# Patient Record
Sex: FEMALE | Race: BLACK OR AFRICAN AMERICAN | NOT HISPANIC OR LATINO | Employment: FULL TIME | URBAN - METROPOLITAN AREA
[De-identification: names, ages, dates, MRNs, and addresses within clinical notes are randomized per-mention and may not be internally consistent; named-entity substitution may affect disease eponyms.]

---

## 2017-02-21 ENCOUNTER — INITIAL PRENATAL (OUTPATIENT)
Dept: OBGYN | Facility: CLINIC | Age: 22
End: 2017-02-21
Payer: MEDICAID

## 2017-02-21 ENCOUNTER — HOSPITAL ENCOUNTER (OUTPATIENT)
Facility: MEDICAL CENTER | Age: 22
End: 2017-02-21
Attending: NURSE PRACTITIONER
Payer: MEDICAID

## 2017-02-21 VITALS
DIASTOLIC BLOOD PRESSURE: 74 MMHG | WEIGHT: 209 LBS | SYSTOLIC BLOOD PRESSURE: 118 MMHG | HEIGHT: 67 IN | BODY MASS INDEX: 32.8 KG/M2

## 2017-02-21 DIAGNOSIS — Z34.82 PRENATAL CARE, SUBSEQUENT PREGNANCY, SECOND TRIMESTER: ICD-10-CM

## 2017-02-21 DIAGNOSIS — Z98.891 HISTORY OF CESAREAN DELIVERY: ICD-10-CM

## 2017-02-21 DIAGNOSIS — O09.891 SUPERVISION OF OTHER HIGH RISK PREGNANCY, ANTEPARTUM, FIRST TRIMESTER: ICD-10-CM

## 2017-02-21 PROBLEM — O09.899 SUPERVISION OF OTHER HIGH RISK PREGNANCY, ANTEPARTUM: Status: ACTIVE | Noted: 2017-02-21

## 2017-02-21 LAB
APPEARANCE UR: NORMAL
BILIRUB UR STRIP-MCNC: NORMAL MG/DL
COLOR UR AUTO: NORMAL
GLUCOSE UR STRIP.AUTO-MCNC: NORMAL MG/DL
KETONES UR STRIP.AUTO-MCNC: NORMAL MG/DL
LEUKOCYTE ESTERASE UR QL STRIP.AUTO: NORMAL
NITRITE UR QL STRIP.AUTO: NORMAL
PH UR STRIP.AUTO: 5 [PH] (ref 5–8)
PROT UR QL STRIP: NORMAL MG/DL
RBC UR QL AUTO: NORMAL
SP GR UR STRIP.AUTO: 1.01
UROBILINOGEN UR STRIP-MCNC: NORMAL MG/DL

## 2017-02-21 PROCEDURE — 87624 HPV HI-RISK TYP POOLED RSLT: CPT

## 2017-02-21 PROCEDURE — 81002 URINALYSIS NONAUTO W/O SCOPE: CPT | Performed by: NURSE PRACTITIONER

## 2017-02-21 PROCEDURE — 88175 CYTOPATH C/V AUTO FLUID REDO: CPT

## 2017-02-21 PROCEDURE — 87491 CHLMYD TRACH DNA AMP PROBE: CPT

## 2017-02-21 PROCEDURE — 59402 PR NEW OB HIGH RISK: CPT | Performed by: NURSE PRACTITIONER

## 2017-02-21 PROCEDURE — 87591 N.GONORRHOEAE DNA AMP PROB: CPT

## 2017-02-21 NOTE — PROGRESS NOTES
"S:  Joanne Claire is a 21 y.o.  who presents for her new OB exam.  She is 13w2d with and LUDY of Estimated Date of Delivery: 17 by LMP.  She has no complaints except for nausea for which she does not wish for intervention. She believes it is resolving now that she is approaching second trimester.  She is currently not working outside the home. No heavy lifting or chemical exposure. No ER visits or previous care in this pregnancy.     desires AFP.  declines CF.  Denies VB, LOF, or cramping.  Denies dysuria, vaginal DC. Reports possible fetal movement.     Pt is  and lives with FOB.  Pregnancy is desired.      Past Medical History   Diagnosis Date   • ASTHMA Dx @ 5 yo     inhaler - does use now - last 7 or 8 yo     Family History   Problem Relation Age of Onset   • Arthritis Mother    • Diabetes Maternal Grandmother      insulin   • Hypertension Maternal Grandmother      Social History     Social History   • Marital Status: Single     Spouse Name: N/A   • Number of Children: N/A   • Years of Education: N/A     Occupational History   • Not on file.     Social History Main Topics   • Smoking status: Never Smoker    • Smokeless tobacco: Never Used   • Alcohol Use: No   • Drug Use: No      Comment: THC use- quit in May of 2014   • Sexual Activity:     Partners: Male      Comment: None     Other Topics Concern   • Not on file     Social History Narrative     OB History    Para Term  AB SAB TAB Ectopic Multiple Living   3 2 2       2      # Outcome Date GA Lbr Chandler/2nd Weight Sex Delivery Anes PTL Lv   3 Current            2 Term 01/18/15 40w2d  3.26 kg (7 lb 3 oz) F CS-Unspec EPI Y Y   1 Term 13 41w0d  3.657 kg (8 lb 1 oz) F CS-LTranv Spinal N Y      Comments: Arrest of dilation.          History of HSV I or II in self or partner: no  History of Thyroid problems: no    O:    Filed Vitals:    17 0757   BP: 118/74   Height: 1.702 m (5' 7\")   Weight: 94.802 kg (209 lb)    "   See Prenatal Physical.    Wet mount: none      A:   1.  IUP @ 13w2d per LMP        2.  S=D        3.  See problem list below               Patient Active Problem List    Diagnosis Date Noted   • History of  delivery 2017   • Supervision of other high risk pregnancy, antepartum 2017   • ASTHMA          P:  1.  GC/CT & pap done        2.  Prenatal labs ordered - lab slip given        3.  Discussed PNV, diet, avoidances and adequate water intake        4.  NOB packet given        5.  Return to office in 4 wks        6.  Complete OB US in 5 wks           No orders of the defined types were placed in this encounter.

## 2017-02-21 NOTE — PROGRESS NOTES
NOB visit   Pt c/o having cramping and nausea, denies any other complications.   # [phone]

## 2017-02-21 NOTE — MR AVS SNAPSHOT
"Joanne Claire   2017 7:30 AM   Initial Prenatal   MRN: [id number]    Department[facility]  [facility]   Dept Phone:  [phone]    Description:  Female : 1995   Provider:  Matilde Figueroa D.N.P.; [facility] INTAKE           Allergies as of 2017     No Known Allergies      You were diagnosed with     Prenatal care, subsequent pregnancy, second trimester   [658665]       History of  delivery   [830380]       Supervision of other high risk pregnancy, antepartum, first trimester   [4077946]         Vital Signs     Blood Pressure Height Weight Body Mass Index Last Menstrual Period Smoking Status    118/74 mmHg 1.702 m (5' 7\") 94.802 kg (209 lb) 32.73 kg/m2 2016 Never Smoker       Basic Information     Date Of Birth Sex Race Ethnicity Preferred Language    1995 Female Black or  Non- English      Your appointments     Mar 22, 2017 10:45 AM   OB Follow Up with Matilde Figueroa D.N.P.   [facility] ([address] [id number] [phone]            2017  8:00 AM   US PREG 60 with PREG CTR US 1   [facility]  [address] [place] [address]   [phone]           For [facility] [place] patients only: 1. Please arrive 15 min prior to your appointment time. 2. If you're late, you will be rescheduled for the next available appointment. 3. If you need to reschedule your appointment, please call us at [phone] 48 hours prior to your appointment. 4. Do not bring children as they will not be allowed in exam room. 5. Only one family member may be present in room during exam. 6. The exam will be 30-60 minutes depending on the exam ordered by the physician. 7. The sonographer is not allowed to discuss findings during the exam. Your provider will go over the results with you at your next appointment. 8. The purpose of this ultrasound " is to determine if baby is healthy. Diagnostic ultrasounds are NOT to determine the gender of the baby. 9. NO photography or video recording is allowed in exam room. 10. NO cell phones allowed in the exam room. INFORMACION SOBRE ALDANA ULTRASONIDO 1. Por favor de llegar 15 minutos antes de aldana nicole. 2. Si llega tarde, le tenemos que cambiar la nicole para otra fecha. 3. Si necesita cambiar aldana nicole, por favor llame 48 horas antes de la nicole. [phone] 4. Por favor no traer niños. No se permiten en cuarto de Ultrasonido. 5. Solamente se permite rubio persona en el cuarto patel el examen. 6. El examen dura 30-60 minutos, dependiendo del examen ordenado por el Doctor. 7. El Sonógrafo no está autorizado hablar sobre aldana examen. Aldana doctor o partera le va explicar los resultados en aldana próxima nicole. 8. El propósito del Ultrasonido es para determinar si aldana hiram viene saludable. No es para determinar el sexo de aldana hiram. 9. Por favor no fotos o cámaras de grabar. 10. No celulares permitidos en el cuarto de examen.              Problem List              ICD-10-CM Priority Class Noted - Resolved    ASTHMA    Unknown - Present    History of  delivery Z98.891   2017 - Present    Supervision of other high risk pregnancy, antepartum O09.899   2017 - Present      Health Maintenance        Date Due Completion Dates    IMM HEP B VACCINE (1 of 3 - Primary Series) 1993 ---    IMM HEP A VACCINE (1 of 2 - Standard Series) 1994 ---    IMM HPV VACCINE (1 of 3 - Female 3 Dose Series) 2004 ---    IMM VARICELLA (CHICKENPOX) VACCINE (1 of 2 - 2 Dose Adolescent Series) 2006 ---    IMM INFLUENZA (1) 2016 10/24/2014    PAP SMEAR 2017    IMM DTaP/Tdap/Td Vaccine (2 - Td) 10/24/2024 10/24/2014            Results     POCT Urinalysis      Component Value Standard Range & Units    POC Color  Negative    POC Appearance  Negative    POC Leukocyte Esterase trace Negative    POC Nitrites neg Negative     POC Urobiligen  Negative (0.2) mg/dL    POC Protein trace Negative mg/dL    POC Urine PH 5.0 5.0 - 8.0    POC Blood trace Negative    POC Specific Gravity 1.015 <1.005 - >1.030    POC Ketones neg Negative mg/dL    POC Biliruben  Negative mg/dL    POC Glucose neg Negative mg/dL                        Current Immunizations     Influenza Vaccine Quad Inj (Pf) 10/24/2014    MMR/Varicella Combined Vaccine  Incomplete,  Incomplete    Pneumococcal polysaccharide vaccine (PPSV-23) 1/19/2015  1:17 PM    Tdap Vaccine  Incomplete, 10/24/2014,  Incomplete      Below and/or attached are the medications your provider expects you to take. Review all of your home medications and newly ordered medications with your provider and/or pharmacist. Follow medication instructions as directed by your provider and/or pharmacist. Please keep your medication list with you and share with your provider. Update the information when medications are discontinued, doses are changed, or new medications (including over-the-counter products) are added; and carry medication information at all times in the event of emergency situations     Allergies:  No Known Allergies          Medications  Valid as of: February 21, 2017 -  8:31 AM    Generic Name Brand Name Tablet Size Instructions for use    Prenatal MV-Min-Fe Fum-FA-DHA   Take  by mouth.        .                 Medicines prescribed today were sent to:     [email or web address] [id number] [place] - [address] [facility]    [address] [facility] [address]    Phone: [phone] Fax: [phone]    Open 24 Hours?: No    [facility]ACY #[id number] - [place] - [address] [address]    Phone: [phone] Fax: [phone]    Open 24 Hours?: No      Medication refill instructions:       If your prescription bottle indicates you have medication refills left, it is not necessary to call your provider’s office. Please contact your  pharmacy and they will refill your medication.    If your prescription bottle indicates you do not have any refills left, you may request refills at any time through one of the following ways: The online [employer] system (except Urgent Care), by calling your provider’s office, or by asking your pharmacy to contact your provider’s office with a refill request. Medication refills are processed only during regular business hours and may not be available until the next business day. Your provider may request additional information or to have a follow-up visit with you prior to refilling your medication.   *Please Note: Medication refills are assigned a new Rx number when refilled electronically. Your pharmacy may indicate that no refills were authorized even though a new prescription for the same medication is available at the pharmacy. Please request the medicine by name with the pharmacy before contacting your provider for a refill.        Your To Do List     Future Labs/Procedures Complete By Expires    PREG CNTR PRENATAL PN  As directed 2/22/2018    THINPREP RFLX HPV ASCUS W/CTNG  As directed 2/21/2018    US-OB 2ND 3RD TRI COMPLETE  As directed 2/21/2018         [employer] Status: Patient Declined

## 2017-02-22 LAB
C TRACH DNA GENITAL QL NAA+PROBE: NEGATIVE
CYTOLOGY REG CYTOL: NORMAL
N GONORRHOEA DNA GENITAL QL NAA+PROBE: NEGATIVE
SPECIMEN SOURCE: NORMAL

## 2017-02-24 LAB
HPV HR 12 DNA CVX QL NAA+PROBE: POSITIVE
HPV16 DNA SPEC QL NAA+PROBE: POSITIVE
HPV18 DNA SPEC QL NAA+PROBE: POSITIVE
SPECIMEN SOURCE: ABNORMAL

## 2017-02-27 ENCOUNTER — TELEPHONE (OUTPATIENT)
Dept: OBGYN | Facility: CLINIC | Age: 22
End: 2017-02-27

## 2017-02-27 PROBLEM — R87.810 ASCUS WITH POSITIVE HIGH RISK HPV CERVICAL: Status: ACTIVE | Noted: 2017-02-27

## 2017-02-27 PROBLEM — R87.610 ASCUS WITH POSITIVE HIGH RISK HPV CERVICAL: Status: ACTIVE | Noted: 2017-02-27

## 2017-02-27 NOTE — TELEPHONE ENCOUNTER
----- Message from Matilde Figueroa D.N.P. sent at 2/27/2017  9:43 AM PST -----  Per ASCCP guidelines ASCUS with HPV needs colpo.      Pt notified of abnormal pap and need colposcopy. Procedure explained to pt. Colpo scheduled for 3/17/17 at 0900. All questions answered. Pt agreed and verbalized understanding.

## 2017-04-04 ENCOUNTER — GYNECOLOGY VISIT (OUTPATIENT)
Dept: OBGYN | Facility: CLINIC | Age: 22
End: 2017-04-04
Payer: MEDICAID

## 2017-04-04 VITALS — SYSTOLIC BLOOD PRESSURE: 108 MMHG | WEIGHT: 206 LBS | BODY MASS INDEX: 32.26 KG/M2 | DIASTOLIC BLOOD PRESSURE: 68 MMHG

## 2017-04-04 DIAGNOSIS — R87.811 ASCUS WITH POSITIVE HIGH RISK HUMAN PAPILLOMAVIRUS OF VAGINA: ICD-10-CM

## 2017-04-04 DIAGNOSIS — Z98.891 HISTORY OF CESAREAN DELIVERY: ICD-10-CM

## 2017-04-04 DIAGNOSIS — R87.620 ASCUS WITH POSITIVE HIGH RISK HUMAN PAPILLOMAVIRUS OF VAGINA: ICD-10-CM

## 2017-04-04 PROCEDURE — 57452 EXAM OF CERVIX W/SCOPE: CPT | Performed by: OBSTETRICS & GYNECOLOGY

## 2017-04-04 PROCEDURE — 90040 PR PRENATAL FOLLOW UP: CPT | Performed by: OBSTETRICS & GYNECOLOGY

## 2017-04-04 NOTE — MR AVS SNAPSHOT
Joanne Claire   2017 10:00 AM   Gynecology Visit   MRN: [id number]    Department:  [facility]   Dept Phone:  [phone]    Description:  Female : 1995   Provider:  Naima Saunders M.D.           Allergies as of 2017     No Known Allergies      You were diagnosed with     ASCUS with positive high risk human papillomavirus of vagina   [0994418]       History of  delivery   [690196]         Vital Signs     Blood Pressure Weight Last Menstrual Period Smoking Status          108/68 mmHg 93.441 kg (206 lb) 2016 Never Smoker         Basic Information     Date Of Birth Sex Race Ethnicity Preferred Language    1995 Female Black or  Non- English      Your appointments     2017  8:00 AM   US PREG 60 with PREG CTR US 1   [facility] ([facility])    [facility]  [address] [place] [id number] [phone]           For [facility] patients only: 1. Please arrive 15 min prior to your appointment time. 2. If you're late, you will be rescheduled for the next available appointment. 3. If you need to reschedule your appointment, please call us at [phone] 48 hours prior to your appointment. 4. Do not bring children as they will not be allowed in exam room. 5. Only one family member may be present in room during exam. 6. The exam will be 30-60 minutes depending on the exam ordered by the physician. 7. The sonographer is not allowed to discuss findings during the exam. Your provider will go over the results with you at your next appointment. 8. The purpose of this ultrasound is to determine if baby is healthy. Diagnostic ultrasounds are NOT to determine the gender of the baby. 9. NO photography or video recording is allowed in exam room. 10. NO cell phones allowed in the exam room. INFORMACION SOBRE HANNON ULTRASONIDO 1. Por favor de llegar 15 minutos antes de hannon nicole. 2. Si llega  tarde, le tenemos que cambiar la nicole para otra fecha. 3. Si necesita cambiar aldana nicole, por favor llame 48 horas antes de la nicole. [phone] 4. Por favor no traer niños. No se permiten en cuarto de Ultrasonido. 5. Solamente se permite rubio persona en el cuarto patel el examen. 6. El examen dura 30-60 minutos, dependiendo del examen ordenado por el Doctor. 7. El Sonógrafo no está autorizado hablar sobre aldana examen. Aldana doctor o partera le va explicar los resultados en aldana próxima nicole. 8. El propósito del Ultrasonido es para determinar si aldana hiram viene saludable. No es para determinar el sexo de aldana hiram. 9. Por favor no fotos o cámaras de grabar. 10. No celulares permitidos en el cuarto de examen.            May 02, 2017 11:30 AM   OB Follow Up with CABRERA Mcdermott   [facility])    [address] [place] [address] [address]  [facility] [address]   [phone]              Problem List              ICD-10-CM Priority Class Noted - Resolved    ASTHMA    Unknown - Present    History of  delivery Z98.891   2017 - Present    Supervision of other high risk pregnancy, antepartum O09.899   2017 - Present    ASCUS with positive high risk HPV cervical R87.610, R87.810   2017 - Present      Health Maintenance        Date Due Completion Dates    IMM HEP B VACCINE (1 of 3 - Primary Series) 1993 ---    IMM HEP A VACCINE (1 of 2 - Standard Series) 1994 ---    IMM HPV VACCINE (1 of 3 - Female 3 Dose Series) 2004 ---    IMM VARICELLA (CHICKENPOX) VACCINE (1 of 2 - 2 Dose Adolescent Series) 2006 ---    PAP SMEAR 2020, 2014    IMM DTaP/Tdap/Td Vaccine (2 - Td) 10/24/2024 10/24/2014            Current Immunizations     Influenza Vaccine Quad Inj (Pf) 10/24/2014    MMR/Varicella Combined Vaccine  Incomplete,  Incomplete    Pneumococcal polysaccharide vaccine (PPSV-23) 2015  1:17 PM    Tdap Vaccine  Incomplete, 10/24/2014,  Incomplete      Below and/or attached are  the medications your provider expects you to take. Review all of your home medications and newly ordered medications with your provider and/or pharmacist. Follow medication instructions as directed by your provider and/or pharmacist. Please keep your medication list with you and share with your provider. Update the information when medications are discontinued, doses are changed, or new medications (including over-the-counter products) are added; and carry medication information at all times in the event of emergency situations     Allergies:  No Known Allergies          Medications  Valid as of: April 04, 2017 - 10:41 AM    Generic Name Brand Name Tablet Size Instructions for use    Prenatal MV-Min-Fe Fum-FA-DHA   Take  by mouth.        .                 Medicines prescribed today were sent to:     [email or web address] [address] [place] [address] - [address] [address] [facility]    [address] [address] [facility] [address]    Phone: [phone] Fax: [phone]    Open 24 Hours?: No    [facility]/PHARMACY #[id number] - [place], [place] - [address] [facility]    [address] [facility] [place] [address]    Phone: [phone] Fax: [phone]    Open 24 Hours?: No      Medication refill instructions:       If your prescription bottle indicates you have medication refills left, it is not necessary to call your provider’s office. Please contact your pharmacy and they will refill your medication.    If your prescription bottle indicates you do not have any refills left, you may request refills at any time through one of the following ways: The online [employer] system (except Urgent Care), by calling your provider’s office, or by asking your pharmacy to contact your provider’s office with a refill request. Medication refills are processed only during regular business hours and may not be available until the next business day. Your provider may request additional information or to have a follow-up visit with you prior to refilling  your medication.   *Please Note: Medication refills are assigned a new Rx number when refilled electronically. Your pharmacy may indicate that no refills were authorized even though a new prescription for the same medication is available at the pharmacy. Please request the medicine by name with the pharmacy before contacting your provider for a refill.        Your To Do List     Future Labs/Procedures Complete By Expires    [facility] TETRA  As directed 4/5/2018      Instructions    PTL precautions          MyChart Access Code: Activation code not generated  Current [employer] Status: Active

## 2017-04-04 NOTE — PROGRESS NOTES
Pt. Here for OB/FU for COLPO today. Reports Good FM.   Good # [phone]  U/S on 4/11/17  Pt states went to [place] on 4/2/17 for really bad side pains, had U/S done.   Pt states no complaints.   Pt desires AFP

## 2017-04-04 NOTE — PROGRESS NOTES
Pt denies CTX, LOF, VB or any other c/o.  Good fetal movement. Pt also presents for colpo for ASCUS with positive HR HPV    Lab:No results found for this or any previous visit (from the past 672 hour(s)).     Colposcopy    Indication:  ASCUS with positive HR HPV    Prior to beginning the procedure, risk and benefits of a colposcopy with possibility of biopsies were discussed including the risk of infection, bleeding, and pain. Patient understands the risks associated with the procedure, questions have been answered and consents have been signed to the chart.    Procedure:    Speculum is placed and cervix is visualized. The cervix is cleansed with dilute acetic acid solution.     Satisfactory: YES   Squamo-columnar junction seen: YES   Entire lesion seen: YES   Biopsies done: NO   Biopsy site:  N/A  ECC: NO    Impression:  CLARA 1 d/t AWE        Recommendations:    PP pap    See colpo sheet for further info.      A/P:  21 y.o.  at 19w2d presents for obstetric follow-up and colpo    1.  Continue prenatal vitamins.  2.  Begin/continue kick counts at 28 weeks   3.  Watch weight gain.  4.  Increase water intake.  5.  Follow-up in 4 weeks.  6.  PTL/labor precautions given

## 2017-04-04 NOTE — Clinical Note
COLPOSCOPY / LEEP DATA SHEET    Joanne Claire     1995      21 y.o.      Patient's last menstrual period was 11/20/2016.     @EDC@       Medical history:   o MVP    o Blood dyscrasia    o Rh     o Other: .    STD history:    o HPV     o HSV     o HIV     o GC     o Chlamydia     o None     o Other: .    HIV:   o Positive     o Negative                            IV Drug User:   o  Yes    o  No .    Age of first coitus:                                                Number of sex partners in lifetime:  .    Reason for exam:.    Prior abnormal PAPS?    o  Yes  o  No        Treatment: .    Prior history of condyloma?    o  Yes  o  No        Treatment:.     Colposcopic view - description:                                 Satisfactory?    o  Yes  o  No                    Squamo-columnar junction seen?  o  Yes  o  No.    Entire lesion seen?     o  Yes  o  No          PAP done?  o  Yes  o  No           Biopsies done?  o  Yes  o  No.    Biopsy sites:.    ECC done?    o  Yes  o  No      If no, reason:.    Impression:.    Recommendations:.             Colposcopist: ______________________________________________ Date: ___________________

## 2017-04-05 ENCOUNTER — HOSPITAL ENCOUNTER (OUTPATIENT)
Dept: LAB | Facility: MEDICAL CENTER | Age: 22
End: 2017-04-05
Attending: OBSTETRICS & GYNECOLOGY
Payer: MEDICAID

## 2017-04-05 ENCOUNTER — HOSPITAL ENCOUNTER (OUTPATIENT)
Dept: LAB | Facility: MEDICAL CENTER | Age: 22
End: 2017-04-05
Attending: NURSE PRACTITIONER
Payer: MEDICAID

## 2017-04-05 DIAGNOSIS — Z34.82 PRENATAL CARE, SUBSEQUENT PREGNANCY, SECOND TRIMESTER: ICD-10-CM

## 2017-04-05 DIAGNOSIS — Z98.891 HISTORY OF CESAREAN DELIVERY: ICD-10-CM

## 2017-04-05 DIAGNOSIS — R87.811 ASCUS WITH POSITIVE HIGH RISK HUMAN PAPILLOMAVIRUS OF VAGINA: ICD-10-CM

## 2017-04-05 DIAGNOSIS — R87.620 ASCUS WITH POSITIVE HIGH RISK HUMAN PAPILLOMAVIRUS OF VAGINA: ICD-10-CM

## 2017-04-05 LAB
ABO GROUP BLD: NORMAL
AMORPH CRY #/AREA URNS HPF: PRESENT /HPF
APPEARANCE UR: ABNORMAL
BACTERIA #/AREA URNS HPF: ABNORMAL /HPF
BASOPHILS # BLD AUTO: 0.5 % (ref 0–1.8)
BASOPHILS # BLD: 0.04 K/UL (ref 0–0.12)
BILIRUB UR QL STRIP.AUTO: NEGATIVE
BLD GP AB SCN SERPL QL: NORMAL
COLOR UR: COLORLESS
CULTURE IF INDICATED INDCX: YES UA CULTURE
EOSINOPHIL # BLD AUTO: 0.19 K/UL (ref 0–0.51)
EOSINOPHIL NFR BLD: 2.3 % (ref 0–6.9)
EPI CELLS #/AREA URNS HPF: ABNORMAL /HPF
ERYTHROCYTE [DISTWIDTH] IN BLOOD BY AUTOMATED COUNT: 45.2 FL (ref 35.9–50)
GLUCOSE UR STRIP.AUTO-MCNC: NEGATIVE MG/DL
HBV SURFACE AG SER QL: NEGATIVE
HCT VFR BLD AUTO: 35.2 % (ref 37–47)
HGB BLD-MCNC: 11.6 G/DL (ref 12–16)
HIV 1+2 AB+HIV1 P24 AG SERPL QL IA: NON REACTIVE
IMM GRANULOCYTES # BLD AUTO: 0.03 K/UL (ref 0–0.11)
IMM GRANULOCYTES NFR BLD AUTO: 0.4 % (ref 0–0.9)
KETONES UR STRIP.AUTO-MCNC: NEGATIVE MG/DL
LEUKOCYTE ESTERASE UR QL STRIP.AUTO: ABNORMAL
LYMPHOCYTES # BLD AUTO: 2.76 K/UL (ref 1–4.8)
LYMPHOCYTES NFR BLD: 33.5 % (ref 22–41)
MCH RBC QN AUTO: 24.9 PG (ref 27–33)
MCHC RBC AUTO-ENTMCNC: 33 G/DL (ref 33.6–35)
MCV RBC AUTO: 75.7 FL (ref 81.4–97.8)
MICRO URNS: ABNORMAL
MONOCYTES # BLD AUTO: 0.67 K/UL (ref 0–0.85)
MONOCYTES NFR BLD AUTO: 8.1 % (ref 0–13.4)
NEUTROPHILS # BLD AUTO: 4.54 K/UL (ref 2–7.15)
NEUTROPHILS NFR BLD: 55.2 % (ref 44–72)
NITRITE UR QL STRIP.AUTO: NEGATIVE
NRBC # BLD AUTO: 0 K/UL
NRBC BLD AUTO-RTO: 0 /100 WBC
PH UR STRIP.AUTO: 6.5 [PH]
PLATELET # BLD AUTO: 308 K/UL (ref 164–446)
PMV BLD AUTO: 10.8 FL (ref 9–12.9)
PROT UR QL STRIP: NEGATIVE MG/DL
RBC # BLD AUTO: 4.65 M/UL (ref 4.2–5.4)
RBC UR QL AUTO: NEGATIVE
RH BLD: NORMAL
RUBV AB SER QL: 38.4 IU/ML
SP GR UR STRIP.AUTO: 1.01
TREPONEMA PALLIDUM IGG+IGM AB [PRESENCE] IN SERUM OR PLASMA BY IMMUNOASSAY: NON REACTIVE
WBC # BLD AUTO: 8.2 K/UL (ref 4.8–10.8)
WBC #/AREA URNS HPF: ABNORMAL /HPF

## 2017-04-05 PROCEDURE — 36415 COLL VENOUS BLD VENIPUNCTURE: CPT

## 2017-04-05 PROCEDURE — 81001 URINALYSIS AUTO W/SCOPE: CPT

## 2017-04-05 PROCEDURE — 86762 RUBELLA ANTIBODY: CPT

## 2017-04-05 PROCEDURE — 86900 BLOOD TYPING SEROLOGIC ABO: CPT

## 2017-04-05 PROCEDURE — 86780 TREPONEMA PALLIDUM: CPT

## 2017-04-05 PROCEDURE — 86850 RBC ANTIBODY SCREEN: CPT

## 2017-04-05 PROCEDURE — 87086 URINE CULTURE/COLONY COUNT: CPT

## 2017-04-05 PROCEDURE — 86901 BLOOD TYPING SEROLOGIC RH(D): CPT

## 2017-04-05 PROCEDURE — 81511 FTL CGEN ABNOR FOUR ANAL: CPT

## 2017-04-05 PROCEDURE — 85025 COMPLETE CBC W/AUTO DIFF WBC: CPT

## 2017-04-05 PROCEDURE — 87389 HIV-1 AG W/HIV-1&-2 AB AG IA: CPT

## 2017-04-05 PROCEDURE — 87340 HEPATITIS B SURFACE AG IA: CPT

## 2017-04-07 LAB
# FETUSES US: NORMAL
AFP MOM SERPL: 0.7
AFP SERPL-MCNC: 32 NG/ML
AGE - REPORTED: 24.3 YR
BACTERIA UR CULT: NORMAL
GA METHOD: NORMAL
GA: 19.43 WEEKS
HCG MOM SERPL: 1.18
HCG SERPL-ACNC: NORMAL IU/L
IDDM PATIENT QL: NO
INHIBIN A MOM SERPL: 0.4
INHIBIN A SERPL-MCNC: 55 PG/ML
INTEGRATED SCN PATIENT-IMP: NORMAL
PATHOLOGY STUDY: NORMAL
SIGNIFICANT IND 70042: NORMAL
SOURCE SOURCE: NORMAL
U ESTRIOL MOM SERPL: 0.66
U ESTRIOL SERPL-MCNC: 1.14 NG/ML

## 2017-04-11 ENCOUNTER — APPOINTMENT (OUTPATIENT)
Dept: RADIOLOGY | Facility: IMAGING CENTER | Age: 22
End: 2017-04-11
Attending: NURSE PRACTITIONER
Payer: MEDICAID

## 2017-04-11 DIAGNOSIS — Z34.82 PRENATAL CARE, SUBSEQUENT PREGNANCY, SECOND TRIMESTER: ICD-10-CM

## 2017-04-11 PROCEDURE — 76815 OB US LIMITED FETUS(S): CPT | Performed by: OBSTETRICS & GYNECOLOGY

## 2017-04-12 ENCOUNTER — TELEPHONE (OUTPATIENT)
Dept: OBGYN | Facility: CLINIC | Age: 22
End: 2017-04-12

## 2017-04-12 NOTE — TELEPHONE ENCOUNTER
----- Message from Luzma Ramirez M.D. sent at 4/12/2017  2:34 PM PDT -----  Please call patient and inform her that her due date was changed based on US, LUDY now 9/14/17.  I have ordered a repeat fetal survey to be done in 1-2 weeks (she was too early at previous US).      4/12/17 1519 called but number in file not accepting calls at this time. My chart message sent  4/14/17 1410 not accepting calls at this time and has not answer [employer] message. Will send letter today. DIAMOND in chart

## 2017-04-12 NOTE — Clinical Note
April 14, 2017          Dear Joanne Claire,      Please call us regarding your care.  One of the nurses is available to speak with you Monday through Friday, 8 a.m to 11:30 and 1 p.m. to 4:30 p.m.    Please call us at [phone]; thank you for your prompt attention.        Sincerely,    Angie Orosco R.N.    Electronically Signed

## 2017-05-11 ENCOUNTER — ROUTINE PRENATAL (OUTPATIENT)
Dept: OBGYN | Facility: CLINIC | Age: 22
End: 2017-05-11
Payer: MEDICAID

## 2017-05-11 VITALS — WEIGHT: 210 LBS | SYSTOLIC BLOOD PRESSURE: 112 MMHG | BODY MASS INDEX: 32.88 KG/M2 | DIASTOLIC BLOOD PRESSURE: 60 MMHG

## 2017-05-11 DIAGNOSIS — O09.892 SUPERVISION OF OTHER HIGH RISK PREGNANCY, ANTEPARTUM, SECOND TRIMESTER: ICD-10-CM

## 2017-05-11 PROCEDURE — 90040 PR PRENATAL FOLLOW UP: CPT | Performed by: NURSE PRACTITIONER

## 2017-05-11 NOTE — PROGRESS NOTES
S) Pt is a 22 y.o.   at 22w0d  gestation. Routine prenatal care today. No complaints. Discussed need for other US due to early gestational age at last US.  Reports good fetal movement. Denies cramping, bleeding or leaking of fluid. Denies dysuria. Generally feels well today. Good self-care activities identified. Denies headaches, swelling, visual changes, or epigastric pain.     O) see flow         Filed Vitals:    17 1035   BP: 112/60   Weight: 95.255 kg (210 lb)           Lab:PNL WNL, GCT next visit, AFP normal       Pertinent ultrasound     2017 7:51 AM    HISTORY/REASON FOR EXAM:  Size less than dates    TECHNIQUE/EXAM DESCRIPTION: OB limited ultrasound.    COMPARISON:  None    FINDINGS:  Fetal Lie:  Vertex  LMP:  2016  Clinical LUDY by LMP:  2017    Placenta (Location):  Posterior  Placenta Previa: No    Amniotic Fluid Volume:  JASON = 11.42 cm    Fetal Heart Rate:  146 bpm    Cervical Length:  3.14 cm    No maternal adnexal mass is identified.    Fetal Biometry  BPD    3.88 cm, 17 weeks, 6 days  HC    14.40 cm, 17 weeks, 4 days  AC    11.47 cm, 17 weeks, 2 days  Femur Length    2.54 cm, 17 weeks, 5 days  Humerus Length    2.68 cm, 18 weeks, 3 days    EGA by this US:  17 weeks, 5 days  LUDY by this US: 2017    Estimated Fetal Weight:  197 grams    Comments:         Impression        Single intrauterine pregnancy of an estimated gestational age of 17 weeks, 5 days with an estimated date of delivery of 2017.  Size less than expected for dates.    Complete fetal survey was not performed, due to early gestational age.  Follow-up recommended.                A) IUP at 22w0d       S=D         Patient Active Problem List    Diagnosis Date Noted   • ASCUS with positive high risk HPV cervical 2017   • History of  delivery 2017   • Supervision of other high risk pregnancy, antepartum 2017   • ASTHMA            P) s/s ptl vs general discomforts. Fetal movements  reviewed. General ed and anticipatory guidance. Nutrition/exercise/vitamin. Plans breast. Plans pp contraception- BTL with C/S. Continue PNV.

## 2017-05-11 NOTE — PROGRESS NOTES
OB f/u today  + fetal movement.  No VB, LOF or UC's.  Good phone # [phone] -mom  Preferred pharmacy confirmed.  Pt was notified at this appt she needs another full anatomy scan, per Dr. Ramirez's notes; pt will schedule that today  PN labs done  AFP test - normal screen

## 2017-05-11 NOTE — MR AVS SNAPSHOT
Joanne Claire   2017 10:30 AM   Routine Prenatal   MRN: [id number]    Department:  [facility]   Dept Phone:  [phone]    Description:  Female : 1995   Provider:  Magali Merida C.N.M.           Allergies as of 2017     No Known Allergies      You were diagnosed with     Supervision of other high risk pregnancy, antepartum, second trimester   [[id number]]         Vital Signs     Blood Pressure Weight Last Menstrual Period Smoking Status          112/60 mmHg 95.255 kg (210 lb) 2016 Never Smoker         Basic Information     Date Of Birth Sex Race Ethnicity Preferred Language    1995 Female Black or  Non- English      Your appointments     May 30, 2017  8:00 AM   US PREG 60 with PREG CTR US 1   [facility] ([facility])    [facility]  [address] [place] [id number] [phone]           For [facility] patients only: 1. Please arrive 15 min prior to your appointment time. 2. If you're late, you will be rescheduled for the next available appointment. 3. If you need to reschedule your appointment, please call us at [phone] 48 hours prior to your appointment. 4. Do not bring children as they will not be allowed in exam room. 5. Only one family member may be present in room during exam. 6. The exam will be 30-60 minutes depending on the exam ordered by the physician. 7. The sonographer is not allowed to discuss findings during the exam. Your provider will go over the results with you at your next appointment. 8. The purpose of this ultrasound is to determine if baby is healthy. Diagnostic ultrasounds are NOT to determine the gender of the baby. 9. NO photography or video recording is allowed in exam room. 10. NO cell phones allowed in the exam room. INFORMACION SOBRE ALDANA ULTRASONIDO 1. Por favor de llegar 15 minutos antes de aldana nicole. 2. Si llega tarde, le tenemos que cambiar  la nicole para otra fecha. 3. Si necesita cambiar aldana nicole, por favor llame 48 horas antes de la nicole. [phone] 4. Por favor no traer niños. No se permiten en cuarto de Ultrasonido. 5. Solamente se permite rubio persona en el cuarto patel el examen. 6. El examen dura 30-60 minutos, dependiendo del examen ordenado por el Doctor. 7. El Sonógrafo no está autorizado hablar sobre aldana examen. Aldana doctor o partera le va explicar los resultados en aldana próxima nicole. 8. El propósito del Ultrasonido es para determinar si aldana hiram viene saludable. No es para determinar el sexo de aldana hiram. 9. Por favor no fotos o cámaras de grabar. 10. No celulares permitidos en el cuarto de examen.            2017  9:00 AM   OB Follow Up with Matilde Figueroa D.N.P.   [facility] [address]  [facility] [address]   [phone]              Problem List              ICD-10-CM Priority Class Noted - Resolved    ASTHMA  Medium  Unknown - Present    History of  delivery Z98.891 High  2017 - Present    Supervision of other high risk pregnancy, antepartum O09.899 High  2017 - Present    ASCUS with positive high risk HPV cervical R87.610, R87.810 Medium  2017 - Present      Health Maintenance        Date Due Completion Dates    IMM HEP B VACCINE (1 of 3 - Primary Series) 1993 ---    IMM HEP A VACCINE (1 of 2 - Standard Series) 1994 ---    IMM HPV VACCINE (1 of 3 - Female 3 Dose Series) 2004 ---    IMM VARICELLA (CHICKENPOX) VACCINE (1 of 2 - 2 Dose Adolescent Series) 2006 ---    PAP SMEAR 2020, 2014    IMM DTaP/Tdap/Td Vaccine (2 - Td) 10/24/2024 10/24/2014            Current Immunizations     Influenza Vaccine Quad Inj (Pf) 10/24/2014    MMR/Varicella Combined Vaccine  Incomplete,  Incomplete    Pneumococcal polysaccharide vaccine (PPSV-23) 2015  1:17 PM    Tdap Vaccine  Incomplete, 10/24/2014,  Incomplete      Below and/or attached are the  medications your provider expects you to take. Review all of your home medications and newly ordered medications with your provider and/or pharmacist. Follow medication instructions as directed by your provider and/or pharmacist. Please keep your medication list with you and share with your provider. Update the information when medications are discontinued, doses are changed, or new medications (including over-the-counter products) are added; and carry medication information at all times in the event of emergency situations     Allergies:  No Known Allergies          Medications  Valid as of: May 11, 2017 - 10:58 AM    Generic Name Brand Name Tablet Size Instructions for use    Prenatal MV-Min-Fe Fum-FA-DHA   Take  by mouth.        .                 Medicines prescribed today were sent to:     [facility] [address] [place] [address] [facility]    [address] [facility] [address]    Phone: [phone] Fax: [phone]    Open 24 Hours?: No    [id number] - [place], [place] - [address]    [address] [facility] [place] [address]    Phone: [phone] Fax: [phone]    Open 24 Hours?: No      Medication refill instructions:       If your prescription bottle indicates you have medication refills left, it is not necessary to call your provider’s office. Please contact your pharmacy and they will refill your medication.    If your prescription bottle indicates you do not have any refills left, you may request refills at any time through one of the following ways: The online [employer] system (except Urgent Care), by calling your provider’s office, or by asking your pharmacy to contact your provider’s office with a refill request. Medication refills are processed only during regular business hours and may not be available until the next business day. Your provider may request additional information or to have a follow-up visit with you prior to refilling your  medication.   *Please Note: Medication refills are assigned a new Rx number when refilled electronically. Your pharmacy may indicate that no refills were authorized even though a new prescription for the same medication is available at the pharmacy. Please request the medicine by name with the pharmacy before contacting your provider for a refill.        Other Notes About Your Plan     PNL WNL, AFP WNL, US Sched, BTL with C/S           MyChart Access Code: Activation code not generated  Current MyChart Status: Active

## 2017-05-30 ENCOUNTER — APPOINTMENT (OUTPATIENT)
Dept: RADIOLOGY | Facility: IMAGING CENTER | Age: 22
End: 2017-05-30
Attending: OBSTETRICS & GYNECOLOGY
Payer: MEDICAID

## 2017-05-30 ENCOUNTER — DATING (OUTPATIENT)
Dept: OBGYN | Facility: CLINIC | Age: 22
End: 2017-05-30

## 2017-05-30 DIAGNOSIS — O09.892 SUPERVISION OF OTHER HIGH RISK PREGNANCY, ANTEPARTUM, SECOND TRIMESTER: ICD-10-CM

## 2017-05-30 PROCEDURE — 76805 OB US >/= 14 WKS SNGL FETUS: CPT | Performed by: OBSTETRICS & GYNECOLOGY

## 2017-06-08 ENCOUNTER — ROUTINE PRENATAL (OUTPATIENT)
Dept: OBGYN | Facility: CLINIC | Age: 22
End: 2017-06-08
Payer: MEDICAID

## 2017-06-08 VITALS — SYSTOLIC BLOOD PRESSURE: 110 MMHG | BODY MASS INDEX: 32.26 KG/M2 | WEIGHT: 206 LBS | DIASTOLIC BLOOD PRESSURE: 60 MMHG

## 2017-06-08 DIAGNOSIS — O09.899 SUPERVISION OF OTHER HIGH RISK PREGNANCIES, UNSPECIFIED TRIMESTER: ICD-10-CM

## 2017-06-08 PROCEDURE — 90040 PR PRENATAL FOLLOW UP: CPT | Performed by: NURSE PRACTITIONER

## 2017-06-08 NOTE — MR AVS SNAPSHOT
Joanne Claire   2017 9:00 AM   Routine Prenatal   MRN: [id number]    Department:  [facility]   Dept Phone:  [phone]    Description:  Female : 1995   Provider:  Matilde Figueroa D.N.P.           Allergies as of 2017     No Known Allergies      You were diagnosed with     Supervision of other high risk pregnancies, unspecified trimester   [[id number]]         Vital Signs     Blood Pressure Weight Last Menstrual Period Smoking Status          110/60 mmHg 93.441 kg (206 lb) 2016 Never Smoker         Basic Information     Date Of Birth Sex Race Ethnicity Preferred Language    1995 Female Black or  Non- English      Your appointments     2017 11:30 AM   OB Follow Up with CABRERA Mcdermott   [facility] [address]  [facility] [id number] [phone]              Problem List              ICD-10-CM Priority Class Noted - Resolved    ASTHMA  Medium  Unknown - Present    History of  delivery Z98.891 High  2017 - Present    Supervision of other high risk pregnancy, antepartum O09.899 High  2017 - Present    ASCUS with positive high risk HPV cervical R87.610, R87.810 Medium  2017 - Present      Health Maintenance        Date Due Completion Dates    IMM HEP B VACCINE (1 of 3 - Primary Series) 1993 ---    IMM HEP A VACCINE (1 of 2 - Standard Series) 1994 ---    IMM HPV VACCINE (1 of 3 - Female 3 Dose Series) 2004 ---    IMM VARICELLA (CHICKENPOX) VACCINE (1 of 2 - 2 Dose Adolescent Series) 2006 ---    PAP SMEAR 2020, 2014    IMM DTaP/Tdap/Td Vaccine (2 - Td) 10/24/2024 10/24/2014            Current Immunizations     Influenza Vaccine Quad Inj (Pf) 10/24/2014    MMR/Varicella Combined Vaccine  Incomplete,  Incomplete    Pneumococcal polysaccharide vaccine (PPSV-23) 2015  1:17 PM    Tdap Vaccine  Incomplete, 10/24/2014,  Incomplete      Below and/or  attached are the medications your provider expects you to take. Review all of your home medications and newly ordered medications with your provider and/or pharmacist. Follow medication instructions as directed by your provider and/or pharmacist. Please keep your medication list with you and share with your provider. Update the information when medications are discontinued, doses are changed, or new medications (including over-the-counter products) are added; and carry medication information at all times in the event of emergency situations     Allergies:  No Known Allergies          Medications  Valid as of: June 08, 2017 -  9:17 AM    Generic Name Brand Name Tablet Size Instructions for use    Prenatal MV-Min-Fe Fum-FA-DHA   Take  by mouth.        .                 Medicines prescribed today were sent to:     [facility] #[id number] - [place], [place] - [address] [facility]    [address] [facility] [place] [address]    Phone: [phone] Fax: [phone]    Open 24 Hours?: No      Medication refill instructions:       If your prescription bottle indicates you have medication refills left, it is not necessary to call your provider’s office. Please contact your pharmacy and they will refill your medication.    If your prescription bottle indicates you do not have any refills left, you may request refills at any time through one of the following ways: The online [employer] system (except Urgent Care), by calling your provider’s office, or by asking your pharmacy to contact your provider’s office with a refill request. Medication refills are processed only during regular business hours and may not be available until the next business day. Your provider may request additional information or to have a follow-up visit with you prior to refilling your medication.   *Please Note: Medication refills are assigned a new Rx number when refilled electronically. Your pharmacy may indicate that no refills were authorized even though a new  prescription for the same medication is available at the pharmacy. Please request the medicine by name with the pharmacy before contacting your provider for a refill.        Your To Do List     Future Labs/Procedures Complete By Expires    GLUCOSE 1HR GESTATIONAL  As directed 6/8/2018    HCT  As directed 6/8/2018    HGB  As directed 6/8/2018    T.PALLIDUM AB EIA  As directed 6/8/2018      Other Notes About Your Plan     PNL WNL, AFP WNL, US Sched, BTL with C/S           MyChart Access Code: Activation code not generated  Current MyChart Status: Active

## 2017-06-08 NOTE — PROGRESS NOTES
S) Pt is a 22 y.o.   at 26w0d  gestation. Routine prenatal care today. States no problems today. Anticipates repeat c/s and BTL.  Reports good  fetal movement. Denies cramping, bleeding or leaking of fluid. Denies dysuria. Generally feels well today. Good self-care activities identified. Denies headaches.     O) see flow         Filed Vitals:    17 0901   BP: 110/60   Weight: 93.441 kg (206 lb)           Lab: normal prenatal panel, normal AFP       Pertinent ultrasound - Normal fetal survey            A) IUP at 26w0d       S=D         Patient Active Problem List    Diagnosis Date Noted   • History of  delivery 2017     Priority: High   • Supervision of other high risk pregnancy, antepartum 2017     Priority: High   • ASCUS with positive high risk HPV cervical 2017     Priority: Medium   • ASTHMA      Priority: Medium       P) s/s ptl vs general discomforts. Fetal movements reviewed. General ed and anticipatory guidance. Nutrition/exercise/vitamin. Lab slip and instructions for gestational glucose. Anticipates repeat c/s and btl.

## 2017-06-08 NOTE — PROGRESS NOTES
Pt here today for OB follow up  Pt states no complaints   Reports +FM  Good # [phone] -[facility]  Pharmacy Confirmed.  Pt given 1 hr gtt and instructions.

## 2017-06-22 ENCOUNTER — ROUTINE PRENATAL (OUTPATIENT)
Dept: OBGYN | Facility: CLINIC | Age: 22
End: 2017-06-22
Payer: MEDICAID

## 2017-06-22 VITALS — DIASTOLIC BLOOD PRESSURE: 70 MMHG | BODY MASS INDEX: 32.26 KG/M2 | WEIGHT: 206 LBS | SYSTOLIC BLOOD PRESSURE: 110 MMHG

## 2017-06-22 DIAGNOSIS — O09.893 SUPERVISION OF OTHER HIGH RISK PREGNANCY, ANTEPARTUM, THIRD TRIMESTER: Primary | ICD-10-CM

## 2017-06-22 PROCEDURE — 90040 PR PRENATAL FOLLOW UP: CPT | Performed by: NURSE PRACTITIONER

## 2017-06-22 PROCEDURE — 90471 IMMUNIZATION ADMIN: CPT | Performed by: NURSE PRACTITIONER

## 2017-06-22 PROCEDURE — 90715 TDAP VACCINE 7 YRS/> IM: CPT | Performed by: NURSE PRACTITIONER

## 2017-06-22 NOTE — PROGRESS NOTES
Pt here for OB/FU Reports Good Fetal Movement.  Pt states no complications today.   Pt would like TDAP today    Pt would like BTL   Kick count sheet was given and explained to pt.  1 Hr GTT labs done on Monday at [facility].    # [phone]  Tdap vaccine given today, LEFT deltoid. Screening checklist reviewed with pt.

## 2017-06-22 NOTE — Clinical Note
"Count Your Baby's Movements  Another step to a healthy delivery    A Epic Dress Re Test             Dept: [phone]    How Many Weeks Pregnant? 28w    Date to Begin Countin17              How to use this chart    One way for your physician to keep track of your baby's health is by knowing how often the baby moves (or \"kicks\") in your womb.  You can help your physician to do this by using this chart every day.    Every day, you should see how many hours it takes for your baby to move 10 times.  Start in the morning, as soon as you get up.    · First, write down the time your baby moves until you get to 10.  · Check off one box every time your baby moves until you get to 10.  · Write down the time you finished counting in the last column.  · Total how long it took to count up all 10 movements.  · Finally, fill in the box that shows how long this took.  After counting 10 movements, you no longer have to count any more that day.  The next morning, just start counting again as soon as you get up.    What should you call a \"movement\"?  It is hard to say, because it will feel different from one mother to another and from one pregnancy to the next.  The important thing is that you count the movements the same way throughout your pregnancy.  If you have more questions, you should ask your physician.    Count carefully every day!  SAMPLE:  Week 28    How many hours did it take to feel 10 movements?       Start  Time     1     2     3     4     5     6     7     8     9     10   Finish Time   Mon 8:20 ·  ·  ·  ·  ·  ·  ·  ·  ·  ·  11:40                  Sat               Sun                 IMPORTANT: You should contact your physician if it takes more than two hours for you to feel 10 movements.  Each morning, write down the time and start to count the movements of your baby.  Keep track by checking off one box every time you feel one movement.  When you have " "felt 10 \"kicks\", write down the time you finished counting in the last column.  Then fill in the   box (over the check sherry) for the number of hours it took.  Be sure to read the complete instructions on the previous page.            "

## 2017-06-22 NOTE — PROGRESS NOTES
S:  Pt is  at 28w0d for routine OB follow up.  No concerns today.  had her 1 hr GTT drawn at [facility], no results yet Reports good FM.  Denies VB, LOF, RUCs or vaginal DC.    O:  Please see above vitals.        FHTs: 135        Fundal ht: 28 cm.        S=D      A:  IUP at 28w0d  Patient Active Problem List    Diagnosis Date Noted   • History of  delivery 2017     Priority: High   • Supervision of other high risk pregnancy, antepartum 2017     Priority: High   • ASCUS with positive high risk HPV cervical 2017     Priority: Medium   • ASTHMA      Priority: Medium        P:  1.  Desires BTL, consents signed.          2.  Instructions given on FKCs.          3.  Questions answered.          4.  Encouraged pt to tour L&D.          5.  Encourage adequate water intake.        6.   labor precautions reviewed.         7.  F/u 2 wks.        8.  TDap today.

## 2017-06-22 NOTE — MR AVS SNAPSHOT
Joanne Claire   2017 11:30 AM   Routine Prenatal   MRN: [id number]    Department:  [facility]   Dept Phone:  [phone]    Description:  Female : 1995   Provider:  CABRERA Mcdermott           Allergies as of 2017     No Known Allergies      You were diagnosed with     Supervision of other high risk pregnancy, antepartum, third trimester   [[id number]]  -  Primary       Vital Signs     Blood Pressure Weight Last Menstrual Period Smoking Status          110/70 mmHg 93.441 kg (206 lb) 2016 Never Smoker         Basic Information     Date Of Birth Sex Race Ethnicity Preferred Language    1995 Female Black or  Non- English      Problem List              ICD-10-CM Priority Class Noted - Resolved    ASTHMA  Medium  Unknown - Present    History of  delivery Z98.891 High  2017 - Present    Supervision of other high risk pregnancy, antepartum O09.899 High  2017 - Present    ASCUS with positive high risk HPV cervical R87.610, R87.810 Medium  2017 - Present      Health Maintenance        Date Due Completion Dates    IMM HEP B VACCINE (1 of 3 - Primary Series) 1993 ---    IMM HEP A VACCINE (1 of 2 - Standard Series) 1994 ---    IMM HPV VACCINE (1 of 3 - Female 3 Dose Series) 2004 ---    IMM VARICELLA (CHICKENPOX) VACCINE (1 of 2 - 2 Dose Adolescent Series) 2006 ---    PAP SMEAR 2020, 2014    IMM DTaP/Tdap/Td Vaccine (2 - Td) 10/24/2024 10/24/2014            Current Immunizations     Influenza Vaccine Quad Inj (Pf) 10/24/2014    MMR/Varicella Combined Vaccine  Incomplete,  Incomplete    Pneumococcal polysaccharide vaccine (PPSV-23) 2015  1:17 PM    Tdap Vaccine 2017 11:56 AM,  Incomplete, 10/24/2014,  Incomplete      Below and/or attached are the medications your provider expects you to take. Review all of your home medications and newly ordered medications with your provider and/or  pharmacist. Follow medication instructions as directed by your provider and/or pharmacist. Please keep your medication list with you and share with your provider. Update the information when medications are discontinued, doses are changed, or new medications (including over-the-counter products) are added; and carry medication information at all times in the event of emergency situations     Allergies:  No Known Allergies          Medications  Valid as of: June 22, 2017 - 12:10 PM    Generic Name Brand Name Tablet Size Instructions for use    Prenatal MV-Min-Fe Fum-FA-DHA   Take  by mouth.        .                 Medicines prescribed today were sent to:     [facility]Y #[id number] - [place], [place] - [address]    [address] [place] [address]    Phone: [phone] Fax: [phone]    Open 24 Hours?: No      Medication refill instructions:       If your prescription bottle indicates you have medication refills left, it is not necessary to call your provider’s office. Please contact your pharmacy and they will refill your medication.    If your prescription bottle indicates you do not have any refills left, you may request refills at any time through one of the following ways: The online [employer] system (except Urgent Care), by calling your provider’s office, or by asking your pharmacy to contact your provider’s office with a refill request. Medication refills are processed only during regular business hours and may not be available until the next business day. Your provider may request additional information or to have a follow-up visit with you prior to refilling your medication.   *Please Note: Medication refills are assigned a new Rx number when refilled electronically. Your pharmacy may indicate that no refills were authorized even though a new prescription for the same medication is available at the pharmacy. Please request the medicine by name with the pharmacy before contacting your provider for a  refill.        Instructions          1.  Desires BTL, consents signed.          2.  Instructions given on FKCs.          3.  Questions answered.          4.  Encouraged pt to tour L&D.          5.  Encourage adequate water intake.        6.   labor precautions reviewed.         7.  F/u 2 wks.        8.  TDap today.         Other Notes About Your Plan     PNL WNL, AFP WNL, US Sched, BTL with C/S           MyChart Access Code: Activation code not generated  Current MyChart Status: Active

## 2017-06-22 NOTE — PATIENT INSTRUCTIONS
1.  Desires BTL, consents signed.          2.  Instructions given on FKCs.          3.  Questions answered.          4.  Encouraged pt to tour L&D.          5.  Encourage adequate water intake.        6.   labor precautions reviewed.         7.  F/u 2 wks.        8.  TDap today.

## 2017-06-23 LAB
GLUCOSE 1H P 50 G GLC PO SERPL-MCNC: NORMAL MG/DL
HCT VFR BLD AUTO: NORMAL %
HGB BLD-MCNC: NORMAL G/DL
TREPONEMA PALLIDUM IGG+IGM AB [PRESENCE] IN SERUM OR PLASMA BY IMMUNOASSAY: NOT DETECTED

## 2017-06-26 DIAGNOSIS — D50.9 IRON DEFICIENCY ANEMIA, UNSPECIFIED IRON DEFICIENCY ANEMIA TYPE: ICD-10-CM

## 2017-06-26 RX ORDER — FERROUS SULFATE 325(65) MG
325 TABLET ORAL DAILY
Qty: 30 TAB | Refills: 3 | Status: SHIPPED | OUTPATIENT
Start: 2017-06-26

## 2017-06-27 ENCOUNTER — TELEPHONE (OUTPATIENT)
Dept: OBGYN | Facility: CLINIC | Age: 22
End: 2017-06-27

## 2017-07-06 ENCOUNTER — ROUTINE PRENATAL (OUTPATIENT)
Dept: OBGYN | Facility: CLINIC | Age: 22
End: 2017-07-06
Payer: MEDICAID

## 2017-07-06 VITALS — DIASTOLIC BLOOD PRESSURE: 70 MMHG | SYSTOLIC BLOOD PRESSURE: 110 MMHG | WEIGHT: 206 LBS | BODY MASS INDEX: 32.26 KG/M2

## 2017-07-06 DIAGNOSIS — O09.893 SUPERVISION OF OTHER HIGH RISK PREGNANCY, ANTEPARTUM, THIRD TRIMESTER: Primary | ICD-10-CM

## 2017-07-06 PROCEDURE — 90040 PR PRENATAL FOLLOW UP: CPT | Performed by: NURSE PRACTITIONER

## 2017-07-06 NOTE — MR AVS SNAPSHOT
Joanne Thomason Hill   2017 11:00 AM   Routine Prenatal   MRN: [id number]    [facility]   Dept Phone:  [phone]    Description:  Female : 1995   Provider:  CABRERA Mcdermott           Allergies as of 2017     No Known Allergies      Vital Signs     Blood Pressure Weight Last Menstrual Period Smoking Status          110/70 mmHg 93.441 kg (206 lb) 2016 Never Smoker         Basic Information     Date Of Birth Sex Race Ethnicity Preferred Language    1995 Female Black or  Non- English      Your appointments     2017 10:30 AM   OB Follow Up with Matilde Figueroa D.N.P.   [facility] [address] [place] [address] [phone]              Problem List              ICD-10-CM Priority Class Noted - Resolved    ASTHMA  Medium  Unknown - Present    History of  delivery Z98.891 High  2017 - Present    Supervision of other high risk pregnancy, antepartum O09.899 High  2017 - Present    ASCUS with positive high risk HPV cervical R87.610, R87.810 Medium  2017 - Present      Health Maintenance        Date Due Completion Dates    IMM HEP B VACCINE (1 of 3 - Primary Series) 1993 ---    IMM HEP A VACCINE (1 of 2 - Standard Series) 1994 ---    IMM HPV VACCINE (1 of 3 - Female 3 Dose Series) 2004 ---    IMM VARICELLA (CHICKENPOX) VACCINE (1 of 2 - 2 Dose Adolescent Series) 2006 ---    IMM INFLUENZA (1) 2017 10/24/2014    PAP SMEAR 2020, 2014    IMM DTaP/Tdap/Td Vaccine (3 - Td) 2027, 10/24/2014            Current Immunizations     Influenza Vaccine Quad Inj (Pf) 10/24/2014    MMR/Varicella Combined Vaccine  Incomplete,  Incomplete    Pneumococcal polysaccharide vaccine (PPSV-23) 2015  1:17 PM    Tdap Vaccine 2017 11:56 AM,  Incomplete, 10/24/2014,  Incomplete      Below and/or attached are the medications your provider  expects you to take. Review all of your home medications and newly ordered medications with your provider and/or pharmacist. Follow medication instructions as directed by your provider and/or pharmacist. Please keep your medication list with you and share with your provider. Update the information when medications are discontinued, doses are changed, or new medications (including over-the-counter products) are added; and carry medication information at all times in the event of emergency situations     Allergies:  No Known Allergies          Medications  Valid as of: July 06, 2017 - 11:24 AM    Generic Name Brand Name Tablet Size Instructions for use    Ferrous Sulfate (Tab) ferrous sulfate 325 (65 FE) MG Take 1 Tab by mouth every day.        Prenatal MV-Min-Fe Fum-FA-DHA   Take  by mouth.        .                 Medicines prescribed today were sent to:     [id number] - [place], [place] - [address] [facility]    [address] [facility] [address]    Phone: [phone] Fax: [phone]    Open 24 Hours?: No      Medication refill instructions:       If your prescription bottle indicates you have medication refills left, it is not necessary to call your provider’s office. Please contact your pharmacy and they will refill your medication.    If your prescription bottle indicates you do not have any refills left, you may request refills at any time through one of the following ways: The online Trendyta system (except Urgent Care), by calling your provider’s office, or by asking your pharmacy to contact your provider’s office with a refill request. Medication refills are processed only during regular business hours and may not be available until the next business day. Your provider may request additional information or to have a follow-up visit with you prior to refilling your medication.   *Please Note: Medication refills are assigned a new Rx number when refilled electronically. Your pharmacy may indicate that no  refills were authorized even though a new prescription for the same medication is available at the pharmacy. Please request the medicine by name with the pharmacy before contacting your provider for a refill.        Instructions          1.  PP contraception: desires BTL.        2.  Continue FKCs.          3.  Questions answered.          4.  Encouraged pt to tour L&D.          5.  Encourage adequate water intake.        6.  F/u 2 wks.                Other Notes About Your Plan     PNL WNL, AFP WNL, US Sched, BTL with C/S           MyChart Access Code: Activation code not generated  Current MyChart Status: Active

## 2017-07-06 NOTE — PROGRESS NOTES
Pt here for OB/FU Reports Good Fetal Movement.  Pt needs to take iron   Pt c/o having sina U/Cs  # [phone]

## 2017-07-06 NOTE — PROGRESS NOTES
S:  Pt is  at 30w0d for routine OB follow up.  Reports SOB when she is walking.  Reports good FM.  Denies VB, LOF, RUCs or vaginal DC.    O:  Please see above vitals.        FHTs: 148        Fundal ht: 30 cm.        S=D        1hr GTT: 102 -- reviewed w pt.    A:  IUP at 30w0d  Patient Active Problem List    Diagnosis Date Noted   • History of  delivery 2017     Priority: High   • Supervision of other high risk pregnancy, antepartum 2017     Priority: High   • ASCUS with positive high risk HPV cervical 2017     Priority: Medium   • ASTHMA      Priority: Medium        P:  1.  PP contraception: desires BTL.        2.  Continue FKCs.          3.  Questions answered.          4.  Encouraged pt to tour L&D.          5.  Encourage adequate water intake.        6.  F/u 2 wks.

## 2017-07-06 NOTE — PATIENT INSTRUCTIONS
1.  PP contraception: desires BTL.        2.  Continue FKCs.          3.  Questions answered.          4.  Encouraged pt to tour L&D.          5.  Encourage adequate water intake.        6.  F/u 2 wks.

## 2017-07-20 ENCOUNTER — ROUTINE PRENATAL (OUTPATIENT)
Dept: OBGYN | Facility: CLINIC | Age: 22
End: 2017-07-20
Payer: MEDICAID

## 2017-07-20 VITALS — WEIGHT: 208 LBS | DIASTOLIC BLOOD PRESSURE: 64 MMHG | SYSTOLIC BLOOD PRESSURE: 110 MMHG | BODY MASS INDEX: 32.57 KG/M2

## 2017-07-20 DIAGNOSIS — O09.893 SUPERVISION OF OTHER HIGH RISK PREGNANCY, ANTEPARTUM, THIRD TRIMESTER: ICD-10-CM

## 2017-07-20 PROCEDURE — 90040 PR PRENATAL FOLLOW UP: CPT | Performed by: NURSE PRACTITIONER

## 2017-07-20 NOTE — PROGRESS NOTES
S) Pt is a 22 y.o.   at 32w0d  gestation. Routine prenatal care today. States no problems today. Anticipates repeat c/s with BTL.  Reports good  fetal movement. Denies cramping, bleeding or leaking of fluid. Denies dysuria. Generally feels well today. Good self-care activities identified. Denies headaches.     O) see flow         Filed Vitals:    17 1035   BP: 110/64   Weight: 94.348 kg (208 lb)           Lab: normal prenatal panel, normal Glucose. Normal AFP.        Pertinent ultrasound - Normal fetal survey            A) IUP at 32w0d       S=D         Patient Active Problem List    Diagnosis Date Noted   • History of  delivery 2017     Priority: High   • Supervision of other high risk pregnancy, antepartum 2017     Priority: High   • ASCUS with positive high risk HPV cervical 2017     Priority: Medium   • ASTHMA      Priority: Medium       P) s/s ptl vs general discomforts. Fetal movements reviewed. General ed and anticipatory guidance. Nutrition/exercise/vitamin. Continue PNV. Anticipates repeat c/s with BTL.

## 2017-07-20 NOTE — MR AVS SNAPSHOT
Joanne Claire   2017 10:30 AM   Routine Prenatal   MRN: [id number]    [facility]   Dept Phone:  [phone]    Description:  Female : 1995   Provider:  Matilde Figueroa D.N.P.           Allergies as of 2017     No Known Allergies      Vital Signs     Blood Pressure Weight Last Menstrual Period Smoking Status          110/64 mmHg 94.348 kg (208 lb) 2016 Never Smoker         Basic Information     Date Of Birth Sex Race Ethnicity Preferred Language    1995 Female Black or  Non- English      Your appointments     Aug 03, 2017  4:00 PM   OB Follow Up with KEVIN Diego   [facility] ([facility])    [address] [place] [address] [address]  [facility] [address] [phone]              Problem List              ICD-10-CM Priority Class Noted - Resolved    ASTHMA  Medium  Unknown - Present    History of  delivery Z98.891 High  2017 - Present    Supervision of other high risk pregnancy, antepartum O09.899 High  2017 - Present    ASCUS with positive high risk HPV cervical R87.610, R87.810 Medium  2017 - Present      Health Maintenance        Date Due Completion Dates    IMM HEP B VACCINE (1 of 3 - Primary Series) 1993 ---    IMM HEP A VACCINE (1 of 2 - Standard Series) 1994 ---    IMM HPV VACCINE (1 of 3 - Female 3 Dose Series) 2004 ---    IMM VARICELLA (CHICKENPOX) VACCINE (1 of 2 - 2 Dose Adolescent Series) 2006 ---    IMM INFLUENZA (1) 2017 10/24/2014    PAP SMEAR 2020, 2014    IMM DTaP/Tdap/Td Vaccine (3 - Td) 2027, 10/24/2014            Current Immunizations     Influenza Vaccine Quad Inj (Pf) 10/24/2014    MMR/Varicella Combined Vaccine  Incomplete,  Incomplete    Pneumococcal polysaccharide vaccine (PPSV-23) 2015  1:17 PM    Tdap Vaccine 2017 11:56 AM,  Incomplete, 10/24/2014,  Incomplete      Below and/or attached are the medications your provider  expects you to take. Review all of your home medications and newly ordered medications with your provider and/or pharmacist. Follow medication instructions as directed by your provider and/or pharmacist. Please keep your medication list with you and share with your provider. Update the information when medications are discontinued, doses are changed, or new medications (including over-the-counter products) are added; and carry medication information at all times in the event of emergency situations     Allergies:  No Known Allergies          Medications  Valid as of: July 20, 2017 - 10:41 AM    Generic Name Brand Name Tablet Size Instructions for use    Ferrous Sulfate (Tab) ferrous sulfate 325 (65 FE) MG Take 1 Tab by mouth every day.        Prenatal MV-Min-Fe Fum-FA-DHA   Take  by mouth.        .                 Medicines prescribed today were sent to:     [facility] #[id number] - [place], [place] - [address] [facility]    [address] [facility] [address]    Phone: [phone] Fax: [phone]    Open 24 Hours?: No      Medication refill instructions:       If your prescription bottle indicates you have medication refills left, it is not necessary to call your provider’s office. Please contact your pharmacy and they will refill your medication.    If your prescription bottle indicates you do not have any refills left, you may request refills at any time through one of the following ways: The online mobiDEOS system (except Urgent Care), by calling your provider’s office, or by asking your pharmacy to contact your provider’s office with a refill request. Medication refills are processed only during regular business hours and may not be available until the next business day. Your provider may request additional information or to have a follow-up visit with you prior to refilling your medication.   *Please Note: Medication refills are assigned a new Rx number when refilled electronically. Your pharmacy may indicate that no  refills were authorized even though a new prescription for the same medication is available at the pharmacy. Please request the medicine by name with the pharmacy before contacting your provider for a refill.        Other Notes About Your Plan     PNL WNL, AFP WNL, US Sched, BTL with C/S, normal glucose.            MyChart Access Code: Activation code not generated  Current utoopiahart Status: Active

## 2017-08-03 ENCOUNTER — ROUTINE PRENATAL (OUTPATIENT)
Dept: OBGYN | Facility: CLINIC | Age: 22
End: 2017-08-03
Payer: MEDICAID

## 2017-08-03 VITALS — BODY MASS INDEX: 32.57 KG/M2 | WEIGHT: 208 LBS | DIASTOLIC BLOOD PRESSURE: 60 MMHG | SYSTOLIC BLOOD PRESSURE: 104 MMHG

## 2017-08-03 DIAGNOSIS — D50.9 IRON DEFICIENCY ANEMIA, UNSPECIFIED IRON DEFICIENCY ANEMIA TYPE: ICD-10-CM

## 2017-08-03 DIAGNOSIS — O09.893 SUPERVISION OF OTHER HIGH RISK PREGNANCY, ANTEPARTUM, THIRD TRIMESTER: Primary | ICD-10-CM

## 2017-08-03 DIAGNOSIS — Z98.891 HISTORY OF CESAREAN DELIVERY: ICD-10-CM

## 2017-08-03 PROBLEM — D64.9 ANEMIA: Status: ACTIVE | Noted: 2017-08-03

## 2017-08-03 PROCEDURE — 90040 PR PRENATAL FOLLOW UP: CPT | Performed by: PHYSICIAN ASSISTANT

## 2017-08-03 NOTE — PROGRESS NOTES
Pt here today for OB follow up  Pt states no complaints   Reports +  Good # [phone]  Pharmacy Confirmed.  Pt states she started taking iron.

## 2017-08-03 NOTE — PROGRESS NOTES
Pt has no complaints with cramping, UCs, VB, LOF though pt has had incr pressure only. +FM. PTL precautions reviewed. GBS next visit. C/S referral sent today for 39wk repeat. 1hr GTT, RPR wnl, pt notified and pt taking PO FeSO4 with PNV for low iron. RTC 2 wk or sooner prn.

## 2017-08-03 NOTE — MR AVS SNAPSHOT
Joanne Claire   8/3/2017 4:15 PM   Routine Prenatal   MRN: [id number]    [facility]   Dept Phone:  [phone]    Description:  Female : 1995   Provider:  OSCAR Mcnulty           Allergies as of 8/3/2017     No Known Allergies      Vital Signs     Blood Pressure Weight Last Menstrual Period Smoking Status          104/60 mmHg 94.348 kg (208 lb) 2016 Never Smoker         Basic Information     Date Of Birth Sex Race Ethnicity Preferred Language    1995 Female Black or  Non- English      Problem List              ICD-10-CM Priority Class Noted - Resolved    ASTHMA  Medium  Unknown - Present    History of  delivery Z98.891 High  2017 - Present    Supervision of other high risk pregnancy, antepartum O09.899 High  2017 - Present    ASCUS with positive high risk HPV cervical R87.610, R87.810 Medium  2017 - Present      Health Maintenance        Date Due Completion Dates    IMM HEP B VACCINE (1 of 3 - Primary Series) 1993 ---    IMM HEP A VACCINE (1 of 2 - Standard Series) 1994 ---    IMM HPV VACCINE (1 of 3 - Female 3 Dose Series) 2004 ---    IMM VARICELLA (CHICKENPOX) VACCINE (1 of 2 - 2 Dose Adolescent Series) 2006 ---    IMM INFLUENZA (1) 2017 10/24/2014    PAP SMEAR 2020, 2014    IMM DTaP/Tdap/Td Vaccine (3 - Td) 2027, 10/24/2014            Current Immunizations     Influenza Vaccine Quad Inj (Pf) 10/24/2014    MMR/Varicella Combined Vaccine  Incomplete,  Incomplete    Pneumococcal polysaccharide vaccine (PPSV-23) 2015  1:17 PM    Tdap Vaccine 2017 11:56 AM,  Incomplete, 10/24/2014,  Incomplete      Below and/or attached are the medications your provider expects you to take. Review all of your home medications and newly ordered medications with your provider and/or pharmacist. Follow medication instructions as directed by your provider and/or  pharmacist. Please keep your medication list with you and share with your provider. Update the information when medications are discontinued, doses are changed, or new medications (including over-the-counter products) are added; and carry medication information at all times in the event of emergency situations     Allergies:  No Known Allergies          Medications  Valid as of: August 03, 2017 -  4:28 PM    Generic Name Brand Name Tablet Size Instructions for use    Ferrous Sulfate (Tab) ferrous sulfate 325 (65 FE) MG Take 1 Tab by mouth every day.        Prenatal MV-Min-Fe Fum-FA-DHA   Take  by mouth.        .                 Medicines prescribed today were sent to:     [facility] #[id number] - [place], [place] - [address] [facility]    [address] [facility] [place] [address]    Phone: [phone] Fax: [phone]    Open 24 Hours?: No      Medication refill instructions:       If your prescription bottle indicates you have medication refills left, it is not necessary to call your provider’s office. Please contact your pharmacy and they will refill your medication.    If your prescription bottle indicates you do not have any refills left, you may request refills at any time through one of the following ways: The online [employer] system (except Urgent Care), by calling your provider’s office, or by asking your pharmacy to contact your provider’s office with a refill request. Medication refills are processed only during regular business hours and may not be available until the next business day. Your provider may request additional information or to have a follow-up visit with you prior to refilling your medication.   *Please Note: Medication refills are assigned a new Rx number when refilled electronically. Your pharmacy may indicate that no refills were authorized even though a new prescription for the same medication is available at the pharmacy. Please request the medicine by name with the pharmacy before contacting your  provider for a refill.        Other Notes About Your Plan     PNL WNL, AFP WNL, normal glucose. ABDOULAYE Russo           AOMi Access Code: [email or web address]  Expires: 9/2/2017  4:28 PM    AOMi  A secure, online tool to manage your health information     [employer] AOMi® is a secure, online tool that connects you to your personalized health information from the privacy of your home -- day or night - making it very easy for you to manage your healthcare. Once the activation process is completed, you can even access your medical information using the AOMi abel, which is available for free in the Apple Abel store or Google Play store.     AOMi provides the following levels of access (as shown below):   My Chart Features   [facility] Doctor [facility]  Urgent  Care Non-[facility]  Primary Care  Doctor   Email your healthcare team securely and privately 24/7 X X X    Manage appointments: schedule your next appointment; view details of past/upcoming appointments X      Request prescription refills. X      View recent personal medical records, including lab and immunizations X X X X   View health record, including health history, allergies, medications X X X X   Read reports about your outpatient visits, procedures, consult and ER notes X X X X   See your discharge summary, which is a recap of your hospital and/or ER visit that includes your diagnosis, lab results, and care plan. X X       How to register for AOMi:  1. Go to  [email or web address].  2. Click on the Sign Up Now box, which takes you to the New Member Sign Up page. You will need to provide the following information:  a. Enter your AOMi Access Code exactly as it appears at the top of this page. (You will not need to use this code after you’ve completed the sign-up process. If you do not sign up before the expiration date, you must request a new code.)   b. Enter your date of birth.   c. Enter your home email  address.   d. Click Submit, and follow the next screen’s instructions.  3. Create a Sitesimont ID. This will be your XTRM login ID and cannot be changed, so think of one that is secure and easy to remember.  4. Create a [employer] password. You can change your password at any time.  5. Enter your Password Reset Question and Answer. This can be used at a later time if you forget your password.   6. Enter your e-mail address. This allows you to receive e-mail notifications when new information is available in XTRM.  7. Click Sign Up. You can now view your health information.    For assistance activating your XTRM account, call [phone]

## 2017-08-16 ENCOUNTER — HOSPITAL ENCOUNTER (OUTPATIENT)
Facility: MEDICAL CENTER | Age: 22
End: 2017-08-16
Attending: OBSTETRICS & GYNECOLOGY | Admitting: OBSTETRICS & GYNECOLOGY
Payer: MEDICAID

## 2017-08-25 ENCOUNTER — ROUTINE PRENATAL (OUTPATIENT)
Dept: OBGYN | Facility: CLINIC | Age: 22
End: 2017-08-25
Payer: MEDICAID

## 2017-08-25 ENCOUNTER — HOSPITAL ENCOUNTER (OUTPATIENT)
Facility: MEDICAL CENTER | Age: 22
End: 2017-08-25
Attending: NURSE PRACTITIONER
Payer: MEDICAID

## 2017-08-25 VITALS — WEIGHT: 206 LBS | SYSTOLIC BLOOD PRESSURE: 116 MMHG | DIASTOLIC BLOOD PRESSURE: 62 MMHG | BODY MASS INDEX: 32.26 KG/M2

## 2017-08-25 DIAGNOSIS — O09.893 SUPERVISION OF OTHER HIGH RISK PREGNANCY, ANTEPARTUM, THIRD TRIMESTER: ICD-10-CM

## 2017-08-25 DIAGNOSIS — D50.9 IRON DEFICIENCY ANEMIA, UNSPECIFIED IRON DEFICIENCY ANEMIA TYPE: ICD-10-CM

## 2017-08-25 DIAGNOSIS — Z98.891 HISTORY OF CESAREAN DELIVERY: ICD-10-CM

## 2017-08-25 PROCEDURE — 87653 STREP B DNA AMP PROBE: CPT

## 2017-08-25 PROCEDURE — 90040 PR PRENATAL FOLLOW UP: CPT | Performed by: NURSE PRACTITIONER

## 2017-08-25 NOTE — PROGRESS NOTES
Pt here today for OB follow up  Pt states no complaints   Reports +  Good # [phone]  Pharmacy Confirmed.  GBS Today   Patient is scheduled for pre op with Dr Saunders on 09/05/17 at 3:30pm   Patient is scheduled for C/S on 09/08/17 at 2pm with Dr Saunders and resident to assist

## 2017-08-25 NOTE — PROGRESS NOTES
SUBJECTIVE:  Pt is a 22 y.o.   at 37w1d  gestation. Presents today for follow-up prenatal care. Reports no issues at this time.  Reports good fetal movement. Denies cramping/contractions, bleeding or leaking of fluid. Denies dysuria, headaches, N/V, or other issues at this time. Generally feels well today.     OBJECTIVE:  - See prenatal vitals flow     ASSESSMENT:   - IUP at 37w1d by 17 week US   - S=D  Patient Active Problem List    Diagnosis Date Noted   • History of  delivery 2017     Priority: High   • Supervision of other high risk pregnancy, antepartum 2017     Priority: High   • ASCUS with positive high risk HPV cervical 2017     Priority: Medium   • ASTHMA      Priority: Medium   • Anemia - 10.2/31.6 on 2017         PLAN:  - GBS test today  - S/sx pregnancy and labor warning signs vs general discomforts discussed  - Fetal movements and kick counts reviewed   - Adequate hydration reinforced  - Nutrition/exercise/vitamin education: continued PNV  - Plans for bottle feeding  - No longer desiring BTL, unsure for contraception PP- handout given and reviewed  - S/p TDAP   - Anticipatory guidance given  - RTC in 1 week for follow-up prenatal care  - Repeat C/S scheduled at 39 weeks 17 @ 2pm no longer desires BTL

## 2017-08-25 NOTE — MR AVS SNAPSHOT
Joanne Claire   2017 3:00 PM   Routine Prenatal   MRN: [id number]    [facility]   Dept Phone:  [phone]    Description:  Female : 1995   Provider:  CABRERA Rivera           Allergies as of 2017     No Known Allergies      You were diagnosed with     Iron deficiency anemia, unspecified iron deficiency anemia type   [2458384]       History of  delivery   [692158]       Supervision of other high risk pregnancy, antepartum, third trimester   [0085121]         Vital Signs     Blood Pressure Weight Last Menstrual Period Smoking Status          116/62 mmHg 93.441 kg (206 lb) 2016 Never Smoker         Basic Information     Date Of Birth Sex Race Ethnicity Preferred Language    1995 Female Black or  Non- English      Your appointments     Sep 05, 2017  3:30 PM   OB Follow Up with ALISHA DINH   [facility] [address] [id number] [phone]              Problem List              ICD-10-CM Priority Class Noted - Resolved    ASTHMA  Medium  Unknown - Present    History of  delivery Z98.891 High  2017 - Present    Supervision of other high risk pregnancy, antepartum O09.899 High  2017 - Present    ASCUS with positive high risk HPV cervical R87.610, R87.810 Medium  2017 - Present    Anemia - 10.2/31.6 on  D64.9   8/3/2017 - Present      Health Maintenance        Date Due Completion Dates    IMM HEP B VACCINE (1 of 3 - Primary Series) 1993 ---    IMM HEP A VACCINE (1 of 2 - Standard Series) 1994 ---    IMM HPV VACCINE (1 of 3 - Female 3 Dose Series) 2004 ---    IMM VARICELLA (CHICKENPOX) VACCINE (1 of 2 - 2 Dose Adolescent Series) 2006 ---    IMM INFLUENZA (1) 2017 10/24/2014    PAP SMEAR 2020, 2014    IMM DTaP/Tdap/Td Vaccine (3 - Td) 2027, 10/24/2014            Current Immunizations     Influenza  Vaccine Quad Inj (Pf) 10/24/2014    MMR/Varicella Combined Vaccine  Incomplete,  Incomplete    Pneumococcal polysaccharide vaccine (PPSV-23) 1/19/2015  1:17 PM    Tdap Vaccine 6/22/2017 11:56 AM,  Incomplete, 10/24/2014,  Incomplete      Below and/or attached are the medications your provider expects you to take. Review all of your home medications and newly ordered medications with your provider and/or pharmacist. Follow medication instructions as directed by your provider and/or pharmacist. Please keep your medication list with you and share with your provider. Update the information when medications are discontinued, doses are changed, or new medications (including over-the-counter products) are added; and carry medication information at all times in the event of emergency situations     Allergies:  No Known Allergies          Medications  Valid as of: August 25, 2017 -  3:35 PM    Generic Name Brand Name Tablet Size Instructions for use    Ferrous Sulfate (Tab) ferrous sulfate 325 (65 Fe) MG Take 1 Tab by mouth every day.        Prenatal MV-Min-Fe Fum-FA-DHA   Take  by mouth.        .                 Medicines prescribed today were sent to:     [id number] - [place] - [address] [address]    Phone: [phone] Fax: [phone]    Open 24 Hours?: No      Medication refill instructions:       If your prescription bottle indicates you have medication refills left, it is not necessary to call your provider’s office. Please contact your pharmacy and they will refill your medication.    If your prescription bottle indicates you do not have any refills left, you may request refills at any time through one of the following ways: The online [employer] system (except Urgent Care), by calling your provider’s office, or by asking your pharmacy to contact your provider’s office with a refill request. Medication refills are processed only during regular business hours and may not  be available until the next business day. Your provider may request additional information or to have a follow-up visit with you prior to refilling your medication.   *Please Note: Medication refills are assigned a new Rx number when refilled electronically. Your pharmacy may indicate that no refills were authorized even though a new prescription for the same medication is available at the pharmacy. Please request the medicine by name with the pharmacy before contacting your provider for a refill.        Your To Do List     Future Labs/Procedures Complete By Expires    GRP B STREP, BY PCR (GRIFFITH BROTH)  As directed 8/25/2018    Comments:    SOURCE VAGINAL AND RECTAL      Other Notes About Your Plan     PNL WNL, AFP WNL, normal glucose. ABDOULAYE Russo           Groove Access Code: [email or web address]  Expires: 9/2/2017  4:28 PM    Groove  A secure, online tool to manage your health information     [employer]’s Groove® is a secure, online tool that connects you to your personalized health information from the privacy of your home -- day or night - making it very easy for you to manage your healthcare. Once the activation process is completed, you can even access your medical information using the Groove abel, which is available for free in the Apple Abel store or Google Play store.     Groove provides the following levels of access (as shown below):   My Chart Features   [facility]  Urgent  Care Non-[facility]  Primary Care  Doctor   Email your healthcare team securely and privately 24/7 X X X    Manage appointments: schedule your next appointment; view details of past/upcoming appointments X      Request prescription refills. X      View recent personal medical records, including lab and immunizations X X X X   View health record, including health history, allergies, medications X X X X   Read reports about your outpatient visits, procedures, consult and ER notes X X X X   See  your discharge summary, which is a recap of your hospital and/or ER visit that includes your diagnosis, lab results, and care plan. X X       How to register for [employer]:  1. Go to  [email or web address].  2. Click on the Sign Up Now box, which takes you to the New Member Sign Up page. You will need to provide the following information:  a. Enter your [employer] Access Code exactly as it appears at the top of this page. (You will not need to use this code after you’ve completed the sign-up process. If you do not sign up before the expiration date, you must request a new code.)   b. Enter your date of birth.   c. Enter your home email address.   d. Click Submit, and follow the next screen’s instructions.  3. Create a [employer] ID. This will be your [employer] login ID and cannot be changed, so think of one that is secure and easy to remember.  4. Create a [employer] password. You can change your password at any time.  5. Enter your Password Reset Question and Answer. This can be used at a later time if you forget your password.   6. Enter your e-mail address. This allows you to receive e-mail notifications when new information is available in [employer].  7. Click Sign Up. You can now view your health information.    For assistance activating your [employer] account, call [phone]

## 2017-08-27 LAB — GP B STREP DNA SPEC QL NAA+PROBE: POSITIVE

## 2017-08-28 PROBLEM — O99.820 GROUP B STREPTOCOCCUS CARRIER, +RV CULTURE, CURRENTLY PREGNANT: Status: ACTIVE | Noted: 2017-08-28

## 2017-09-08 ENCOUNTER — ROUTINE PRENATAL (OUTPATIENT)
Dept: OBGYN | Facility: CLINIC | Age: 22
End: 2017-09-08
Payer: MEDICAID

## 2017-09-08 ENCOUNTER — TELEPHONE (OUTPATIENT)
Dept: OBGYN | Facility: CLINIC | Age: 22
End: 2017-09-08

## 2017-09-08 VITALS — SYSTOLIC BLOOD PRESSURE: 118 MMHG | WEIGHT: 211 LBS | DIASTOLIC BLOOD PRESSURE: 64 MMHG | BODY MASS INDEX: 33.05 KG/M2

## 2017-09-08 DIAGNOSIS — D50.9 IRON DEFICIENCY ANEMIA, UNSPECIFIED IRON DEFICIENCY ANEMIA TYPE: ICD-10-CM

## 2017-09-08 DIAGNOSIS — O99.820 GROUP B STREPTOCOCCUS CARRIER, +RV CULTURE, CURRENTLY PREGNANT: ICD-10-CM

## 2017-09-08 PROCEDURE — 90040 PR PRENATAL FOLLOW UP: CPT | Performed by: OBSTETRICS & GYNECOLOGY

## 2017-09-08 NOTE — PROGRESS NOTES
Pre Op today  +FM  Pt denies complaints.   Good phone [phone]  Pt scheduled for  on 2017 @ 9:30am  GBS positive

## 2017-09-08 NOTE — TELEPHONE ENCOUNTER
atient R/S.........  Patient scheduled for Pre OP with Dr Ramirez on 09/08/17 at 3:45pm  Patient scheduled for C/S on 09/12/17 at 9:30am with Dr Ramirez and resident to assist    I called pt to verify that she was aware of her Pre Op appt today.   States she is aware and will be here   NOT FURTHER QUESTIONS.

## 2017-09-12 ENCOUNTER — HOSPITAL ENCOUNTER (INPATIENT)
Facility: MEDICAL CENTER | Age: 22
LOS: 3 days | End: 2017-09-15
Attending: OBSTETRICS & GYNECOLOGY | Admitting: OBSTETRICS & GYNECOLOGY
Payer: MEDICAID

## 2017-09-12 LAB
BASOPHILS # BLD AUTO: 0.5 % (ref 0–1.8)
BASOPHILS # BLD: 0.03 K/UL (ref 0–0.12)
EOSINOPHIL # BLD AUTO: 0.03 K/UL (ref 0–0.51)
EOSINOPHIL NFR BLD: 0.5 % (ref 0–6.9)
ERYTHROCYTE [DISTWIDTH] IN BLOOD BY AUTOMATED COUNT: 39.8 FL (ref 35.9–50)
ERYTHROCYTE [DISTWIDTH] IN BLOOD BY AUTOMATED COUNT: 40.1 FL (ref 35.9–50)
HCT VFR BLD AUTO: 27.7 % (ref 37–47)
HCT VFR BLD AUTO: 30.3 % (ref 37–47)
HGB BLD-MCNC: 9 G/DL (ref 12–16)
HGB BLD-MCNC: 9.6 G/DL (ref 12–16)
HOLDING TUBE BB 8507: NORMAL
IMM GRANULOCYTES # BLD AUTO: 0.04 K/UL (ref 0–0.11)
IMM GRANULOCYTES NFR BLD AUTO: 0.6 % (ref 0–0.9)
LYMPHOCYTES # BLD AUTO: 1.82 K/UL (ref 1–4.8)
LYMPHOCYTES NFR BLD: 29 % (ref 22–41)
MCH RBC QN AUTO: 22.6 PG (ref 27–33)
MCH RBC QN AUTO: 23.6 PG (ref 27–33)
MCHC RBC AUTO-ENTMCNC: 31.7 G/DL (ref 33.6–35)
MCHC RBC AUTO-ENTMCNC: 32.5 G/DL (ref 33.6–35)
MCV RBC AUTO: 71.3 FL (ref 81.4–97.8)
MCV RBC AUTO: 72.7 FL (ref 81.4–97.8)
MONOCYTES # BLD AUTO: 0.55 K/UL (ref 0–0.85)
MONOCYTES NFR BLD AUTO: 8.8 % (ref 0–13.4)
NEUTROPHILS # BLD AUTO: 3.81 K/UL (ref 2–7.15)
NEUTROPHILS NFR BLD: 60.6 % (ref 44–72)
NRBC # BLD AUTO: 0 K/UL
NRBC BLD AUTO-RTO: 0 /100 WBC
PLATELET # BLD AUTO: 168 K/UL (ref 164–446)
PLATELET # BLD AUTO: 185 K/UL (ref 164–446)
PMV BLD AUTO: 11.5 FL (ref 9–12.9)
PMV BLD AUTO: 11.8 FL (ref 9–12.9)
RBC # BLD AUTO: 3.81 M/UL (ref 4.2–5.4)
RBC # BLD AUTO: 4.25 M/UL (ref 4.2–5.4)
WBC # BLD AUTO: 11.8 K/UL (ref 4.8–10.8)
WBC # BLD AUTO: 6.3 K/UL (ref 4.8–10.8)

## 2017-09-12 PROCEDURE — 700105 HCHG RX REV CODE 258: Performed by: OBSTETRICS & GYNECOLOGY

## 2017-09-12 PROCEDURE — 700101 HCHG RX REV CODE 250

## 2017-09-12 PROCEDURE — 700111 HCHG RX REV CODE 636 W/ 250 OVERRIDE (IP)

## 2017-09-12 PROCEDURE — 85025 COMPLETE CBC W/AUTO DIFF WBC: CPT

## 2017-09-12 PROCEDURE — 304964 HCHG RECOVERY ROOM TIME 1HR

## 2017-09-12 PROCEDURE — A9270 NON-COVERED ITEM OR SERVICE: HCPCS

## 2017-09-12 PROCEDURE — 700105 HCHG RX REV CODE 258: Performed by: ANESTHESIOLOGY

## 2017-09-12 PROCEDURE — 770002 HCHG ROOM/CARE - OB PRIVATE (112)

## 2017-09-12 PROCEDURE — 36415 COLL VENOUS BLD VENIPUNCTURE: CPT

## 2017-09-12 PROCEDURE — 59514 CESAREAN DELIVERY ONLY: CPT

## 2017-09-12 PROCEDURE — 4A1HXCZ MONITORING OF PRODUCTS OF CONCEPTION, CARDIAC RATE, EXTERNAL APPROACH: ICD-10-PCS | Performed by: OBSTETRICS & GYNECOLOGY

## 2017-09-12 PROCEDURE — 700111 HCHG RX REV CODE 636 W/ 250 OVERRIDE (IP): Performed by: ANESTHESIOLOGY

## 2017-09-12 PROCEDURE — 700102 HCHG RX REV CODE 250 W/ 637 OVERRIDE(OP)

## 2017-09-12 PROCEDURE — 700102 HCHG RX REV CODE 250 W/ 637 OVERRIDE(OP): Performed by: ANESTHESIOLOGY

## 2017-09-12 PROCEDURE — 306828 HCHG ANES-TIME GENERAL: Performed by: OBSTETRICS & GYNECOLOGY

## 2017-09-12 PROCEDURE — 85027 COMPLETE CBC AUTOMATED: CPT

## 2017-09-12 PROCEDURE — 305385 HCHG SURGICAL SERVICES 1/4 HOUR

## 2017-09-12 PROCEDURE — A9270 NON-COVERED ITEM OR SERVICE: HCPCS | Performed by: ANESTHESIOLOGY

## 2017-09-12 RX ORDER — KETOROLAC TROMETHAMINE 30 MG/ML
30 INJECTION, SOLUTION INTRAMUSCULAR; INTRAVENOUS EVERY 6 HOURS
Status: DISCONTINUED | OUTPATIENT
Start: 2017-09-12 | End: 2017-09-12

## 2017-09-12 RX ORDER — OXYCODONE AND ACETAMINOPHEN 10; 325 MG/1; MG/1
1 TABLET ORAL EVERY 4 HOURS PRN
Status: DISCONTINUED | OUTPATIENT
Start: 2017-09-12 | End: 2017-09-13

## 2017-09-12 RX ORDER — ONDANSETRON 4 MG/1
4 TABLET, ORALLY DISINTEGRATING ORAL EVERY 6 HOURS PRN
Status: DISCONTINUED | OUTPATIENT
Start: 2017-09-12 | End: 2017-09-12

## 2017-09-12 RX ORDER — DOCUSATE SODIUM 100 MG/1
100 CAPSULE, LIQUID FILLED ORAL 2 TIMES DAILY PRN
Status: DISCONTINUED | OUTPATIENT
Start: 2017-09-12 | End: 2017-09-15 | Stop reason: HOSPADM

## 2017-09-12 RX ORDER — MORPHINE SULFATE 4 MG/ML
4 INJECTION, SOLUTION INTRAMUSCULAR; INTRAVENOUS
Status: DISCONTINUED | OUTPATIENT
Start: 2017-09-12 | End: 2017-09-12

## 2017-09-12 RX ORDER — ACETAMINOPHEN 325 MG/1
325 TABLET ORAL EVERY 4 HOURS PRN
Status: DISCONTINUED | OUTPATIENT
Start: 2017-09-13 | End: 2017-09-15 | Stop reason: HOSPADM

## 2017-09-12 RX ORDER — NALOXONE HYDROCHLORIDE 0.4 MG/ML
0.1 INJECTION, SOLUTION INTRAMUSCULAR; INTRAVENOUS; SUBCUTANEOUS PRN
Status: DISCONTINUED | OUTPATIENT
Start: 2017-09-12 | End: 2017-09-13

## 2017-09-12 RX ORDER — METOCLOPRAMIDE HYDROCHLORIDE 5 MG/ML
10 INJECTION INTRAMUSCULAR; INTRAVENOUS ONCE
Status: COMPLETED | OUTPATIENT
Start: 2017-09-12 | End: 2017-09-12

## 2017-09-12 RX ORDER — OXYCODONE AND ACETAMINOPHEN 10; 325 MG/1; MG/1
1 TABLET ORAL EVERY 4 HOURS PRN
Status: DISCONTINUED | OUTPATIENT
Start: 2017-09-13 | End: 2017-09-15 | Stop reason: HOSPADM

## 2017-09-12 RX ORDER — OXYCODONE HYDROCHLORIDE AND ACETAMINOPHEN 5; 325 MG/1; MG/1
1 TABLET ORAL EVERY 4 HOURS PRN
Status: DISCONTINUED | OUTPATIENT
Start: 2017-09-13 | End: 2017-09-15 | Stop reason: HOSPADM

## 2017-09-12 RX ORDER — METOCLOPRAMIDE HYDROCHLORIDE 5 MG/ML
10 INJECTION INTRAMUSCULAR; INTRAVENOUS EVERY 6 HOURS PRN
Status: DISCONTINUED | OUTPATIENT
Start: 2017-09-12 | End: 2017-09-13

## 2017-09-12 RX ORDER — SODIUM CHLORIDE, SODIUM LACTATE, POTASSIUM CHLORIDE, CALCIUM CHLORIDE 600; 310; 30; 20 MG/100ML; MG/100ML; MG/100ML; MG/100ML
INJECTION, SOLUTION INTRAVENOUS CONTINUOUS
Status: DISCONTINUED | OUTPATIENT
Start: 2017-09-12 | End: 2017-09-15 | Stop reason: HOSPADM

## 2017-09-12 RX ORDER — KETOROLAC TROMETHAMINE 30 MG/ML
30 INJECTION, SOLUTION INTRAMUSCULAR; INTRAVENOUS EVERY 6 HOURS
Status: DISCONTINUED | OUTPATIENT
Start: 2017-09-12 | End: 2017-09-13

## 2017-09-12 RX ORDER — MISOPROSTOL 200 UG/1
600 TABLET ORAL
Status: DISCONTINUED | OUTPATIENT
Start: 2017-09-12 | End: 2017-09-15 | Stop reason: HOSPADM

## 2017-09-12 RX ORDER — DIPHENHYDRAMINE HYDROCHLORIDE 50 MG/ML
INJECTION INTRAMUSCULAR; INTRAVENOUS
Status: COMPLETED
Start: 2017-09-12 | End: 2017-09-12

## 2017-09-12 RX ORDER — ACETAMINOPHEN 325 MG/1
325 TABLET ORAL EVERY 4 HOURS PRN
Status: DISCONTINUED | OUTPATIENT
Start: 2017-09-12 | End: 2017-09-12

## 2017-09-12 RX ORDER — DIPHENHYDRAMINE HCL 25 MG
25 TABLET ORAL EVERY 6 HOURS PRN
Status: DISCONTINUED | OUTPATIENT
Start: 2017-09-12 | End: 2017-09-12

## 2017-09-12 RX ORDER — MORPHINE SULFATE 4 MG/ML
4 INJECTION, SOLUTION INTRAMUSCULAR; INTRAVENOUS
Status: DISCONTINUED | OUTPATIENT
Start: 2017-09-13 | End: 2017-09-15 | Stop reason: HOSPADM

## 2017-09-12 RX ORDER — OXYCODONE HYDROCHLORIDE AND ACETAMINOPHEN 5; 325 MG/1; MG/1
1 TABLET ORAL EVERY 4 HOURS PRN
Status: DISCONTINUED | OUTPATIENT
Start: 2017-09-12 | End: 2017-09-12

## 2017-09-12 RX ORDER — OXYCODONE HYDROCHLORIDE AND ACETAMINOPHEN 5; 325 MG/1; MG/1
1 TABLET ORAL EVERY 4 HOURS PRN
Status: DISCONTINUED | OUTPATIENT
Start: 2017-09-12 | End: 2017-09-13

## 2017-09-12 RX ORDER — DIPHENHYDRAMINE HYDROCHLORIDE 50 MG/ML
25 INJECTION INTRAMUSCULAR; INTRAVENOUS EVERY 6 HOURS PRN
Status: DISCONTINUED | OUTPATIENT
Start: 2017-09-12 | End: 2017-09-12

## 2017-09-12 RX ORDER — IBUPROFEN 800 MG/1
800 TABLET ORAL EVERY 8 HOURS PRN
Status: DISCONTINUED | OUTPATIENT
Start: 2017-09-12 | End: 2017-09-12

## 2017-09-12 RX ORDER — METHYLERGONOVINE MALEATE 0.2 MG/ML
0.2 INJECTION INTRAVENOUS
Status: DISCONTINUED | OUTPATIENT
Start: 2017-09-12 | End: 2017-09-15 | Stop reason: HOSPADM

## 2017-09-12 RX ORDER — DIPHENHYDRAMINE HYDROCHLORIDE 50 MG/ML
12.5 INJECTION INTRAMUSCULAR; INTRAVENOUS ONCE
Status: COMPLETED | OUTPATIENT
Start: 2017-09-12 | End: 2017-09-12

## 2017-09-12 RX ORDER — ONDANSETRON 2 MG/ML
4 INJECTION INTRAMUSCULAR; INTRAVENOUS EVERY 6 HOURS PRN
Status: DISCONTINUED | OUTPATIENT
Start: 2017-09-12 | End: 2017-09-13

## 2017-09-12 RX ORDER — DIPHENHYDRAMINE HCL 25 MG
25 TABLET ORAL EVERY 6 HOURS PRN
Status: DISCONTINUED | OUTPATIENT
Start: 2017-09-13 | End: 2017-09-15 | Stop reason: HOSPADM

## 2017-09-12 RX ORDER — ONDANSETRON 2 MG/ML
4 INJECTION INTRAMUSCULAR; INTRAVENOUS EVERY 6 HOURS PRN
Status: DISCONTINUED | OUTPATIENT
Start: 2017-09-13 | End: 2017-09-15 | Stop reason: HOSPADM

## 2017-09-12 RX ORDER — DIPHENHYDRAMINE HYDROCHLORIDE 50 MG/ML
25 INJECTION INTRAMUSCULAR; INTRAVENOUS EVERY 6 HOURS PRN
Status: DISCONTINUED | OUTPATIENT
Start: 2017-09-12 | End: 2017-09-13

## 2017-09-12 RX ORDER — CITRIC ACID/SODIUM CITRATE 334-500MG
30 SOLUTION, ORAL ORAL ONCE
Status: COMPLETED | OUTPATIENT
Start: 2017-09-12 | End: 2017-09-12

## 2017-09-12 RX ORDER — DIPHENHYDRAMINE HYDROCHLORIDE 50 MG/ML
25 INJECTION INTRAMUSCULAR; INTRAVENOUS EVERY 6 HOURS PRN
Status: DISCONTINUED | OUTPATIENT
Start: 2017-09-13 | End: 2017-09-15 | Stop reason: HOSPADM

## 2017-09-12 RX ORDER — SODIUM CHLORIDE, SODIUM LACTATE, POTASSIUM CHLORIDE, CALCIUM CHLORIDE 600; 310; 30; 20 MG/100ML; MG/100ML; MG/100ML; MG/100ML
INJECTION, SOLUTION INTRAVENOUS CONTINUOUS
Status: DISCONTINUED | OUTPATIENT
Start: 2017-09-12 | End: 2017-09-12 | Stop reason: HOSPADM

## 2017-09-12 RX ORDER — ONDANSETRON 2 MG/ML
4 INJECTION INTRAMUSCULAR; INTRAVENOUS EVERY 6 HOURS PRN
Status: DISCONTINUED | OUTPATIENT
Start: 2017-09-12 | End: 2017-09-12

## 2017-09-12 RX ORDER — SODIUM CHLORIDE, SODIUM LACTATE, POTASSIUM CHLORIDE, CALCIUM CHLORIDE 600; 310; 30; 20 MG/100ML; MG/100ML; MG/100ML; MG/100ML
INJECTION, SOLUTION INTRAVENOUS PRN
Status: DISCONTINUED | OUTPATIENT
Start: 2017-09-12 | End: 2017-09-15 | Stop reason: HOSPADM

## 2017-09-12 RX ORDER — DIPHENHYDRAMINE HYDROCHLORIDE 50 MG/ML
12.5 INJECTION INTRAMUSCULAR; INTRAVENOUS EVERY 6 HOURS PRN
Status: DISCONTINUED | OUTPATIENT
Start: 2017-09-12 | End: 2017-09-13

## 2017-09-12 RX ORDER — ONDANSETRON 4 MG/1
4 TABLET, ORALLY DISINTEGRATING ORAL EVERY 6 HOURS PRN
Status: DISCONTINUED | OUTPATIENT
Start: 2017-09-13 | End: 2017-09-15 | Stop reason: HOSPADM

## 2017-09-12 RX ORDER — IBUPROFEN 800 MG/1
800 TABLET ORAL EVERY 8 HOURS PRN
Status: DISCONTINUED | OUTPATIENT
Start: 2017-09-13 | End: 2017-09-15 | Stop reason: HOSPADM

## 2017-09-12 RX ORDER — VITAMIN A ACETATE, BETA CAROTENE, ASCORBIC ACID, CHOLECALCIFEROL, .ALPHA.-TOCOPHEROL ACETATE, DL-, THIAMINE MONONITRATE, RIBOFLAVIN, NIACINAMIDE, PYRIDOXINE HYDROCHLORIDE, FOLIC ACID, CYANOCOBALAMIN, CALCIUM CARBONATE, FERROUS FUMARATE, ZINC OXIDE, CUPRIC OXIDE 3080; 12; 120; 400; 1; 1.84; 3; 20; 22; 920; 25; 200; 27; 10; 2 [IU]/1; UG/1; MG/1; [IU]/1; MG/1; MG/1; MG/1; MG/1; MG/1; [IU]/1; MG/1; MG/1; MG/1; MG/1; MG/1
1 TABLET, FILM COATED ORAL EVERY MORNING
Status: DISCONTINUED | OUTPATIENT
Start: 2017-09-12 | End: 2017-09-15 | Stop reason: HOSPADM

## 2017-09-12 RX ORDER — SODIUM CHLORIDE, SODIUM LACTATE, POTASSIUM CHLORIDE, CALCIUM CHLORIDE 600; 310; 30; 20 MG/100ML; MG/100ML; MG/100ML; MG/100ML
1500 INJECTION, SOLUTION INTRAVENOUS ONCE
Status: COMPLETED | OUTPATIENT
Start: 2017-09-12 | End: 2017-09-12

## 2017-09-12 RX ORDER — OXYCODONE AND ACETAMINOPHEN 10; 325 MG/1; MG/1
1 TABLET ORAL EVERY 4 HOURS PRN
Status: DISCONTINUED | OUTPATIENT
Start: 2017-09-12 | End: 2017-09-12

## 2017-09-12 RX ADMIN — FAMOTIDINE 20 MG: 10 INJECTION, SOLUTION INTRAVENOUS at 08:59

## 2017-09-12 RX ADMIN — DIPHENHYDRAMINE HYDROCHLORIDE 12.5 MG: 50 INJECTION INTRAMUSCULAR; INTRAVENOUS at 12:21

## 2017-09-12 RX ADMIN — OXYCODONE HYDROCHLORIDE AND ACETAMINOPHEN 1 TABLET: 5; 325 TABLET ORAL at 22:04

## 2017-09-12 RX ADMIN — SODIUM CITRATE AND CITRIC ACID MONOHYDRATE 30 ML: 500; 334 SOLUTION ORAL at 08:59

## 2017-09-12 RX ADMIN — METOCLOPRAMIDE 10 MG: 5 INJECTION, SOLUTION INTRAMUSCULAR; INTRAVENOUS at 08:59

## 2017-09-12 RX ADMIN — SODIUM CHLORIDE, POTASSIUM CHLORIDE, SODIUM LACTATE AND CALCIUM CHLORIDE 1000 ML: 600; 310; 30; 20 INJECTION, SOLUTION INTRAVENOUS at 08:02

## 2017-09-12 RX ADMIN — SODIUM CHLORIDE, POTASSIUM CHLORIDE, SODIUM LACTATE AND CALCIUM CHLORIDE: 600; 310; 30; 20 INJECTION, SOLUTION INTRAVENOUS at 08:28

## 2017-09-12 ASSESSMENT — LIFESTYLE VARIABLES
DO YOU DRINK ALCOHOL: NO
ALCOHOL_USE: NO
EVER_SMOKED: NEVER

## 2017-09-12 ASSESSMENT — PATIENT HEALTH QUESTIONNAIRE - PHQ9
1. LITTLE INTEREST OR PLEASURE IN DOING THINGS: NOT AT ALL
2. FEELING DOWN, DEPRESSED, IRRITABLE, OR HOPELESS: NOT AT ALL
SUM OF ALL RESPONSES TO PHQ QUESTIONS 1-9: 0
SUM OF ALL RESPONSES TO PHQ9 QUESTIONS 1 AND 2: 0

## 2017-09-12 ASSESSMENT — COPD QUESTIONNAIRES
COPD SCREENING SCORE: 0
DURING THE PAST 4 WEEKS HOW MUCH DID YOU FEEL SHORT OF BREATH: NONE/LITTLE OF THE TIME
HAVE YOU SMOKED AT LEAST 100 CIGARETTES IN YOUR ENTIRE LIFE: NO/DON'T KNOW
DO YOU EVER COUGH UP ANY MUCUS OR PHLEGM?: NO/ONLY WITH OCCASIONAL COLDS OR INFECTIONS

## 2017-09-12 ASSESSMENT — PAIN SCALES - GENERAL
PAINLEVEL_OUTOF10: 4
PAINLEVEL_OUTOF10: 0

## 2017-09-12 NOTE — PROGRESS NOTES
, EDC9/12 = 40.0 weeks,  Hx previous sections x2    0700-Patient presents to L&D bed LDA 5 for scheduled repeat  section. Patient denies any contractions, leaking of fluid or any vaginal bleeding. EFM and TOCO applied. States positive fetal movement. VSS.    0725-  IV started x1 stick. Labs waked to lab.    0910- report given to LUIS DANIEL Sofia RN and JUSTIN Michael RN

## 2017-09-12 NOTE — OP REPORT
DATE OF SERVICE:  2017    PREOPERATIVE DIAGNOSES:  Intrauterine pregnancy at 39-5/7 weeks with history   of 2 previous  sections.    POSTOPERATIVE DIAGNOSES:  Intrauterine pregnancy at 39-5/7 weeks with history   of 2 previous  sections.    PROCEDURE PERFORMED:  Repeat low transverse  section via Pfannenstiel.    SURGEON:  Luzma Ramirez MD    FIRST ASSISTANT:  Kenzie Suarez MD    ANESTHESIA:  Spinal.    COMPLICATIONS:  None.    ESTIMATED BLOOD LOSS:  600 mL.    FLUIDS:  3000 mL LR.    URINE OUTPUT:  160 mL of clear urine.    INDICATIONS FOR PROCEDURE:  This is a 22-year-old  3, para 2-0-0-2 at   39-5/7 weeks estimated gestational age with history of 2 previous    sections who presents for repeat  section.    FINDINGS:  Viable male infant in cephalic presentation with Apgars of 5 and 9,   clear fluid, normal uterus, tubes, and ovaries.  Dense adhesions involving   the uterus to the anterior abdominal wall.    PROCEDURE IN DETAIL:  The patient was taken to the operating room where spinal   anesthesia was found to be adequate.  She was then prepped and draped in a   normal sterile fashion in the dorsal supine position with a leftward tilt.  A   Pfannenstiel skin incision was made with the scalpel and carried down to the   underlying level of the fascia, which was nicked in the midline and the   incision was extended laterally with Umana scissors.  The superior aspect of   the fascial incision was grasped with Kocher clamps, elevated and the   underlying rectus muscles dissected off sharply.  Attention was then turned to   the inferior aspect of this incision, which in a similar fashion was grasped,   tented up with Kocher clamps and the rectus muscles dissected off sharply.    The rectus muscles were  in the midline and the peritoneum was   identified, tented up and entered sharply with the Metzenbaum scissors.  The   peritoneal incision was extended  superiorly and inferiorly with good   visualization of the bladder.  Dense adhesions were noted involving the uterus   to the peritoneum and the anterior abdominal wall.  The adhesions were sharply lysed.The bladder blade was   inserted and the vesicouterine peritoneum was grasped with the pickups and   entered sharply with the Metzenbaum scissors.  The incision was then extended   laterally and the bladder flap created digitally.  The bladder blade was then   reinserted and the lower uterine segment incised in a transverse fashion with   the scalpel.  The uterine incision was extended laterally digitally.  The   bladder blade was then removed and the infant's head was delivered   atraumatically.  The remainder of the infant was delivered without difficulty.  The nose and mouth were bulb suctioned on the field and the   cord was clamped and cut.  The infant was handed to the awaiting attendants.    Cord gases were sent.  The placenta was then removed manually.  The uterus was   exteriorized and cleared of all clot and debris.  The uterine incision was   repaired with 1-0 chromic in a running locked fashion.  Excellent hemostasis   was noted.  The uterus was returned to the abdomen.  The gutters were cleared   of all clot and debris.  There was not much visible peritoneum to put back   together after the lysis of adhesions.  The peritoneum was partially closed with   3-0 Vicryl and the rectus muscles were reapproximated using interrupted   mattress sutures of 3-0 Vicryl.  The fascia was reapproximated with 0 Vicryl   in a running fashion.  The subcuticular fat was irrigated.  The Jostin's   fascia was closed with interrupted sutures of 2-0 Vicryl and the skin was   closed with staples.  The patient tolerated the procedure well.  Sponge, lap   and needle counts were correct x3.  The patient was taken to the recovery room   in stable condition.       ____________________________________     Luzma Ramirez MD    [facility] /  [facility]    DD:  09/12/2017 12:21:46  DT:  09/12/2017 13:03:25    D#:  [id number]  Job#:  [id number]

## 2017-09-12 NOTE — OR SURGEON
Operative Report    PreOp Diagnosis: IUP @ 39+5/7 wks with hx of 2 previous C/S    PostOp Diagnosis: Same    Procedure(s):  REPEAT C SECTION- Wound Class: Clean Contaminated    Surgeon(s):  Luzma Ramirez M.D.    Anesthesiologist/Type of Anesthesia:  Anesthesiologist: Casper Bauer M.D./* No anesthesia type entered *    Surgical Staff:  * No surgical staff found *    Specimens:  * No specimens in log *    Estimated Blood Loss: 600 cc    Findings: viable male infant in cephalic presentation with apgars of 5 and 9, clear fluid, normal uterus, tubes, ovaries. Dense adhesions involving uterus to anterior abdominal wall.     Complications: None.         9/12/2017 11:34 AM Luzma Ramirez

## 2017-09-12 NOTE — PROGRESS NOTES
Patient admitted to room, medium bright red bleeding, fundus firm 1 fingerbreadth above umbilicus, alert and oriented, respirations 20, o2 sat 99% room air

## 2017-09-12 NOTE — H&P
History and Physical      Joanne Claire is a 22 y.o. year old female  at 39w5d who presents for repeat C/S.     Subjective:   negative  For CTXS.   negative Feels pain   negative for LOF  negative for vaginal bleeding.   positive for fetal movement    ROS: Pertinent items are noted in HPI.    Past Medical History:   Diagnosis Date   • ASTHMA Dx @ 3 yo    inhaler - does use now - last 7 or 8 yo     Past Surgical History:   Procedure Laterality Date   • REPEAT C SECTION  2015    Performed by Pauline Stewart M.D. at [facility]   • PRIMARY C SECTION  2013    Performed by Christina Romero M.D. at [facility]   • TONSILLECTOMY       OB History    Para Term  AB Living   3 2 2     2   SAB TAB Ectopic Molar Multiple Live Births             2      # Outcome Date GA Lbr Chandler/2nd Weight Sex Delivery Anes PTL Lv   3 Current            2 Term 01/18/15 40w2d  3.26 kg (7 lb 3 oz) F CS-Unspec EPI Y LARS   1 Term 13 41w0d  3.657 kg (8 lb 1 oz) F CS-LTranv Spinal N LARS      Birth Comments: Arrest of dilation.        Social History     Social History   • Marital status: Single     Spouse name: N/A   • Number of children: N/A   • Years of education: N/A     Occupational History   • Not on file.     Social History Main Topics   • Smoking status: Never Smoker   • Smokeless tobacco: Never Used   • Alcohol use No   • Drug use: No      Comment: THC use- quit in May of 2014   • Sexual activity: Yes     Partners: Male      Comment: None     Other Topics Concern   • Not on file     Social History Narrative   • No narrative on file     Allergies: Review of patient's allergies indicates no known allergies.    Current Facility-Administered Medications:   •  Sod Citrate-Citric Acid (BICITRA) 500-334 MG/5ML solution 30 mL, 30 mL, Oral, Once, Casper Bauer M.D.  •  famotidine (PEPCID) injection 20 mg, 20 mg, Intravenous, Once, Casper Bauer M.D.  •   "metoclopramide (REGLAN) injection 10 mg, 10 mg, Intravenous, Once, Casper Bauer M.D.  •  lactated ringers infusion, , Intravenous, Continuous, Luzma Ramirez M.D., Last Rate: 125 mL/hr at 17 0828    Prenatal care with [facility] starting at 13 wks with following problems:  Patient Active Problem List    Diagnosis Date Noted   • History of  delivery 2017     Priority: High   • Supervision of other high risk pregnancy, antepartum 2017     Priority: High   • ASCUS with positive high risk HPV cervical 2017     Priority: Medium   • ASTHMA      Priority: Medium   • Group B Streptococcus carrier, +RV culture, currently pregnant 2017   • Anemia - 10.2/31.6 on 2017               Objective:      Blood pressure 123/77, pulse 86, temperature 36.8 °C (98.3 °F), resp. rate 16, height 1.676 m (5' 6\"), weight 95.7 kg (211 lb), last menstrual period 2016, unknown if currently breastfeeding.    General:   no acute distress   Skin:   normal   HEENT:  PERRLA   Lungs:   CTA bilateral   Heart:   S1, S2 normal, no murmur, click, rub or gallop, regular rate and rhythm, brisk carotid upstroke without bruits, peripheral pulses very brisk, chest is clear without rales or wheezing, no pedal edema, no JVD, no hepatosplenomegaly   Abdomen:   gravid, NT   EFW:  3500 grams   Pelvis:  adequate with gynecoid pelvis   FHT:  130s BPM, moderate variability, + accels   Uterine Size: S=D   Presentations: Cephalic   Cervix:     Dilation: Deferred   Lab Review  Lab:   Blood type: O     Recent Results (from the past 5880 hour(s))   POCT Urinalysis    Collection Time: 17  7:50 AM   Result Value Ref Range    POC Color  Negative    POC Appearance  Negative    POC Leukocyte Esterase trace Negative    POC Nitrites neg Negative    POC Urobiligen  Negative (0.2) mg/dL    POC Protein trace Negative mg/dL    POC Urine PH 5.0 5.0 - 8.0    POC Blood trace Negative    POC Specific Gravity 1.015 <1.005 - >1.030 "    POC Ketones neg Negative mg/dL    POC Biliruben  Negative mg/dL    POC Glucose neg Negative mg/dL   THINPREP RFLX HPV ASCUS W/CTNG    Collection Time: 02/21/17  8:28 AM   Result Value Ref Range    Cytology Reg See Path Report     Source Endo/Cervical     C. trachomatis by PCR Negative Negative    N. gonorrhoeae by PCR Negative Negative   HPV BY PCR ASSOC. W/THINPREP    Collection Time: 02/21/17  8:28 AM   Result Value Ref Range    Source Endo/Cervical     HPV Genotype 16 POSITIVE (A) Negative    HPV Genotype 18 POSITIVE (A) Negative    HPV Other High Risk Genotypes POSITIVE (A) Negative   AFP TETRA    Collection Time: 04/05/17  4:13 PM   Result Value Ref Range    AFP Value -Eia 32 ng/mL    AFP MOM Value 0.70     Hcg Value 19,326 IU/L    Hcg Mom 1.18     Ue3 Value 1.14 ng/mL    Ue3 Mom 0.66     Interpretation Normal     M[age]Y [age]    Gestational Age Based On Unknown     Gestational Age 19.43 weeks    Insulin Dependent Diabetes No     Race Black     Multiple Pregnancy One     Nicole Value -Eia 55 pg/mL    Nicole Mom Value 0.40     Maternal Weight 205 lbs    Err Maternal Scrn AFP See Note    PREG CNTR PRENATAL PN    Collection Time: 04/05/17  4:19 PM   Result Value Ref Range    Rubella IgG Antibody 38.40 IU/mL    Syphilis, Treponemal Qual Non Reactive Non Reactive    Hepatitis B Surface Antigen Negative Negative    WBC 8.2 4.8 - 10.8 K/uL    RBC 4.65 4.20 - 5.40 M/uL    Hemoglobin 11.6 (L) 12.0 - 16.0 g/dL    Hematocrit 35.2 (L) 37.0 - 47.0 %    MCV 75.7 (L) 81.4 - 97.8 fL    MCH 24.9 (L) 27.0 - 33.0 pg    MCHC 33.0 (L) 33.6 - 35.0 g/dL    RDW 45.2 35.9 - 50.0 fL    Platelet Count 308 164 - 446 K/uL    MPV 10.8 9.0 - 12.9 fL    Neutrophils-Polys 55.20 44.00 - 72.00 %    Lymphocytes 33.50 22.00 - 41.00 %    Monocytes 8.10 0.00 - 13.40 %    Eosinophils 2.30 0.00 - 6.90 %    Basophils 0.50 0.00 - 1.80 %    Immature Granulocytes 0.40 0.00 - 0.90 %    Nucleated RBC 0.00 /100 WBC    Neutrophils (Absolute) 4.54  2.00 - 7.15 K/uL    Lymphs (Absolute) 2.76 1.00 - 4.80 K/uL    Monos (Absolute) 0.67 0.00 - 0.85 K/uL    Eos (Absolute) 0.19 0.00 - 0.51 K/uL    Baso (Absolute) 0.04 0.00 - 0.12 K/uL    Immature Granulocytes (abs) 0.03 0.00 - 0.11 K/uL    NRBC (Absolute) 0.00 K/uL    Micro Urine Req Microscopic     Color Colorless     Character Sl Cloudy (A)     Specific Gravity 1.007 <1.035    Ph 6.5 5.0 - 8.0    Glucose Negative Negative mg/dL    Ketones Negative Negative mg/dL    Protein Negative Negative mg/dL    Bilirubin Negative Negative    Nitrite Negative Negative    Leukocyte Esterase Small (A) Negative    Occult Blood Negative Negative    Culture Indicated Yes UA Culture   HIV ANTIBODIES    Collection Time: 04/05/17  4:19 PM   Result Value Ref Range    HIV Ag/Ab Combo Assay Non Reactive Non Reactive   OP PRENATAL PANEL-BLOOD BANK    Collection Time: 04/05/17  4:19 PM   Result Value Ref Range    ABO Grouping Only O     Rh Grouping Only POS     Antibody Screen Scrn NEG    URINE MICROSCOPIC (W/UA)    Collection Time: 04/05/17  4:19 PM   Result Value Ref Range    WBC 0-2 /hpf    Bacteria Few (A) None /hpf    Epithelial Cells Few /hpf    Amorphous Crystal Present /hpf   URINE CULTURE(NEW)    Collection Time: 04/05/17  4:19 PM   Result Value Ref Range    Significant Indicator NEG     Source UR     Urine Culture Mixed skin toya 10-50,000 cfu/mL    GLUCOSE 1HR GESTATIONAL    Collection Time: 06/23/17 12:00 AM   Result Value Ref Range    Glucose, Post Dose 102  mg/dL    HCT    Collection Time: 06/23/17 12:00 AM   Result Value Ref Range    Hematocrit 31.6  %    HGB    Collection Time: 06/23/17 12:00 AM   Result Value Ref Range    Hemoglobin 10.2  g/dL    T.PALLIDUM AB EIA    Collection Time: 06/23/17 12:00 AM   Result Value Ref Range    Syphilis, Treponemal Qual NOT DETECTED    GRP B STREP, BY PCR (GRIFFITH BROTH)    Collection Time: 08/25/17  3:34 PM   Result Value Ref Range    Strep Gp B DNA PCR POSITIVE (A) Negative   CBC WITH  DIFFERENTIAL    Collection Time: 17  7:25 AM   Result Value Ref Range    WBC 6.3 4.8 - 10.8 K/uL    RBC 4.25 4.20 - 5.40 M/uL    Hemoglobin 9.6 (L) 12.0 - 16.0 g/dL    Hematocrit 30.3 (L) 37.0 - 47.0 %    MCV 71.3 (L) 81.4 - 97.8 fL    MCH 22.6 (L) 27.0 - 33.0 pg    MCHC 31.7 (L) 33.6 - 35.0 g/dL    RDW 39.8 35.9 - 50.0 fL    Platelet Count 185 164 - 446 K/uL    MPV 11.5 9.0 - 12.9 fL    Neutrophils-Polys 60.60 44.00 - 72.00 %    Lymphocytes 29.00 22.00 - 41.00 %    Monocytes 8.80 0.00 - 13.40 %    Eosinophils 0.50 0.00 - 6.90 %    Basophils 0.50 0.00 - 1.80 %    Immature Granulocytes 0.60 0.00 - 0.90 %    Nucleated RBC 0.00 /100 WBC    Neutrophils (Absolute) 3.81 2.00 - 7.15 K/uL    Lymphs (Absolute) 1.82 1.00 - 4.80 K/uL    Monos (Absolute) 0.55 0.00 - 0.85 K/uL    Eos (Absolute) 0.03 0.00 - 0.51 K/uL    Baso (Absolute) 0.03 0.00 - 0.12 K/uL    Immature Granulocytes (abs) 0.04 0.00 - 0.11 K/uL    NRBC (Absolute) 0.00 K/uL   HOLD BLOOD BANK SPECIMEN (NOT TESTED)    Collection Time: 17  7:25 AM   Result Value Ref Range    Holding Tube - Bb DONE         Assessment:   Joanne Govioleta Claire at 39w5d  Labor status: Not evaluated. and Not in labor.  Obstetrical history significant for   Patient Active Problem List    Diagnosis Date Noted   • History of  delivery 2017     Priority: High   • Supervision of other high risk pregnancy, antepartum 2017     Priority: High   • ASCUS with positive high risk HPV cervical 2017     Priority: Medium   • ASTHMA      Priority: Medium   • Group B Streptococcus carrier, +RV culture, currently pregnant 2017   • Anemia - 10.2/31.6 on 2017   .      Plan:     Admit to L&D  GBS positive  Hx of previous C/S x2.  Declines BTL.      Discussed with the patient indications for  delivery. The patient voiced understanding of indications for  section at this time.    Discussed with the patient the risks of  delivery. The  risks include bleeding, infection, transfusion, emergency hysterectomy to control bleeding, damage to surrounding organs (bowel, bladder, ureters, nerves, vessels), need for repair or future surgery, fetal injury, unexpected pathology, anesthesia risks, and rarely death.  I also discussed with the patient the risk of wound infection, wound breakdown, and scarring. We discussed that these risks are greater in people that have diabetes or obesity.    The patient had the opportunity to ask questions regarding the procedure. All questions answered to the patient's satisfaction. Plan to proceed with  delivery.

## 2017-09-12 NOTE — PROGRESS NOTES
Late entry: Summary report:    0910- Received report from SHAW Pedroza RN. Assumed care of pt. Pt prepped for repeat  section.    0954- Pt taken to OR # 2 for repeat  section.    1140- Pt in PACU    1240- Pt transferred to PP. Report to ANABELLA Seymour RN

## 2017-09-13 PROCEDURE — 700102 HCHG RX REV CODE 250 W/ 637 OVERRIDE(OP): Performed by: ANESTHESIOLOGY

## 2017-09-13 PROCEDURE — 770002 HCHG ROOM/CARE - OB PRIVATE (112)

## 2017-09-13 PROCEDURE — 700102 HCHG RX REV CODE 250 W/ 637 OVERRIDE(OP): Performed by: OBSTETRICS & GYNECOLOGY

## 2017-09-13 PROCEDURE — A9270 NON-COVERED ITEM OR SERVICE: HCPCS | Performed by: ANESTHESIOLOGY

## 2017-09-13 PROCEDURE — A9270 NON-COVERED ITEM OR SERVICE: HCPCS | Performed by: OBSTETRICS & GYNECOLOGY

## 2017-09-13 RX ADMIN — IBUPROFEN 800 MG: 800 TABLET, FILM COATED ORAL at 20:32

## 2017-09-13 RX ADMIN — OXYCODONE HYDROCHLORIDE AND ACETAMINOPHEN 1 TABLET: 5; 325 TABLET ORAL at 02:25

## 2017-09-13 RX ADMIN — OXYCODONE HYDROCHLORIDE AND ACETAMINOPHEN 1 TABLET: 5; 325 TABLET ORAL at 20:32

## 2017-09-13 RX ADMIN — OXYCODONE HYDROCHLORIDE AND ACETAMINOPHEN 1 TABLET: 5; 325 TABLET ORAL at 16:25

## 2017-09-13 RX ADMIN — OXYCODONE HYDROCHLORIDE AND ACETAMINOPHEN 1 TABLET: 5; 325 TABLET ORAL at 06:38

## 2017-09-13 RX ADMIN — OXYCODONE HYDROCHLORIDE AND ACETAMINOPHEN 1 TABLET: 5; 325 TABLET ORAL at 10:38

## 2017-09-13 RX ADMIN — IBUPROFEN 800 MG: 800 TABLET, FILM COATED ORAL at 11:30

## 2017-09-13 RX ADMIN — Medication 1 TABLET: at 07:13

## 2017-09-13 ASSESSMENT — PAIN SCALES - GENERAL
PAINLEVEL_OUTOF10: 6
PAINLEVEL_OUTOF10: 3
PAINLEVEL_OUTOF10: 6
PAINLEVEL_OUTOF10: 5
PAINLEVEL_OUTOF10: 7
PAINLEVEL_OUTOF10: 6
PAINLEVEL_OUTOF10: 2
PAINLEVEL_OUTOF10: 2
PAINLEVEL_OUTOF10: 1
PAINLEVEL_OUTOF10: 4
PAINLEVEL_OUTOF10: 3

## 2017-09-13 NOTE — CARE PLAN
Problem: Altered physiologic condition related to postoperative  delivery  Goal: Patient physiologically stable as evidenced by normal lochia, palpable uterine involution and vital signs within normal limits  Outcome: PROGRESSING AS EXPECTED  VSS. Fundus firm, lochia light.    Problem: Alteration in comfort related to surgical incision and/or after birth pains  Goal: Patient verbalizes acceptable pain level  Outcome: PROGRESSING AS EXPECTED  Pain controlled with prn medications per mar. Will offer pain medications as they become available.

## 2017-09-13 NOTE — PROGRESS NOTES
Name:   Joanne Claire   Date/Time:  2017 6:55 AM  Gestational Age:  39w6d  Admit Date:   2017  Admitting Dx:   Pregnancy  PREVIOUS  SECTION x2 , 39+5 WEEKS GESTATION  Labor and delivery, indication for care    POD# 1 S/P rpt LTCS    S:  Abdominal pain yes   Ambulating   yes  Tolerating PO  yes  Flatus    no  Bleeding   yes  Voiding   yes   Dizziness   no  Breast feeding  no  Breast tenderness  no    O:  Pulse: 65, Heart Rate (Monitored): (!) 57  Blood Pressure: (!) 90/37 (RN Notified), NIBP: 116/57     Temp  Av.7 °C (98 °F)  Min: 36.1 °C (97 °F)  Max: 37 °C (98.6 °F)  Heart: regular rate and rhythm without gallops or murmurs  Lungs: clear bases  Abdomen: non-distended and appropriately tender / bowelsounds present / incision clean and dry with pressure dressing in place  Extremities: non-tender  Catheter: DC'd    Intake/Output Summary (Last 24 hours) at 17 0655  Last data filed at 17 0200   Gross per 24 hour   Intake             6600 ml   Output             3510 ml   Net             3090 ml       A:  POD# 1 S/P rpt LTCS  Stable/progressing well    P:  Routine C/S Postpartum care, continue pain management, encourage ambulation, anticipate DC POD#3     Kenzie Suarez M.D.

## 2017-09-13 NOTE — PROGRESS NOTES
Bedside report received, assumed care of pt. Assessment complete, VSS, fundus firm, lochia light. Incision dressing clean/dry/intact. Gonzalez catheter in place draining adequate urine to gravity. SCDs in place. Bonding well with infant. Pt states pain is being well controlled and will call for PRN pain meds as needed, denied last dose of Toradol but will check in she wants the rest of them. POC discussed with pt and family, questions answered, call light within reach.

## 2017-09-13 NOTE — PROGRESS NOTES
Assessment complete. Fundus firm, lochia light. VSS. Tolerating diet. Voiding without difficulty. Incision dressing CDI. Pain controlled with prn medications per mar. Will offer pain medications as they become available. Family at bedside. POC discussed with pt. Encouraged to call with needs. Call light in place.

## 2017-09-13 NOTE — CARE PLAN
Problem: Potential for postpartum infection related to surgical incision, compromised uterine condition, urinary tract or respiratory compromise  Goal: Patient will be afebrile and free from signs and symptoms of infection  Outcome: PROGRESSING AS EXPECTED  Pt afebrile, dressing clean/dry/intact. Education provided regarding incision care, pt informed she will remove dressing in shower after 24 hours. No signs of infection at this time.     Problem: Alteration in comfort related to surgical incision and/or after birth pains  Goal: Patient verbalizes acceptable pain level  Outcome: PROGRESSING AS EXPECTED  Pain assessed at least q4h, pt medicated as appropriate per MAR.

## 2017-09-14 PROCEDURE — 770002 HCHG ROOM/CARE - OB PRIVATE (112)

## 2017-09-14 PROCEDURE — 700102 HCHG RX REV CODE 250 W/ 637 OVERRIDE(OP): Performed by: OBSTETRICS & GYNECOLOGY

## 2017-09-14 PROCEDURE — A9270 NON-COVERED ITEM OR SERVICE: HCPCS | Performed by: STUDENT IN AN ORGANIZED HEALTH CARE EDUCATION/TRAINING PROGRAM

## 2017-09-14 PROCEDURE — A9270 NON-COVERED ITEM OR SERVICE: HCPCS | Performed by: OBSTETRICS & GYNECOLOGY

## 2017-09-14 PROCEDURE — 700112 HCHG RX REV CODE 229: Performed by: STUDENT IN AN ORGANIZED HEALTH CARE EDUCATION/TRAINING PROGRAM

## 2017-09-14 RX ADMIN — OXYCODONE HYDROCHLORIDE AND ACETAMINOPHEN 1 TABLET: 10; 325 TABLET ORAL at 21:58

## 2017-09-14 RX ADMIN — DOCUSATE SODIUM 100 MG: 100 CAPSULE ORAL at 21:59

## 2017-09-14 RX ADMIN — IBUPROFEN 800 MG: 800 TABLET, FILM COATED ORAL at 13:59

## 2017-09-14 RX ADMIN — OXYCODONE HYDROCHLORIDE AND ACETAMINOPHEN 1 TABLET: 10; 325 TABLET ORAL at 09:00

## 2017-09-14 RX ADMIN — Medication 1 TABLET: at 09:00

## 2017-09-14 RX ADMIN — OXYCODONE HYDROCHLORIDE AND ACETAMINOPHEN 1 TABLET: 10; 325 TABLET ORAL at 04:47

## 2017-09-14 RX ADMIN — IBUPROFEN 800 MG: 800 TABLET, FILM COATED ORAL at 04:45

## 2017-09-14 RX ADMIN — IBUPROFEN 800 MG: 800 TABLET, FILM COATED ORAL at 21:59

## 2017-09-14 RX ADMIN — OXYCODONE HYDROCHLORIDE AND ACETAMINOPHEN 1 TABLET: 5; 325 TABLET ORAL at 00:38

## 2017-09-14 RX ADMIN — OXYCODONE HYDROCHLORIDE AND ACETAMINOPHEN 1 TABLET: 10; 325 TABLET ORAL at 13:59

## 2017-09-14 ASSESSMENT — PAIN SCALES - GENERAL
PAINLEVEL_OUTOF10: 3
PAINLEVEL_OUTOF10: 7
PAINLEVEL_OUTOF10: 3
PAINLEVEL_OUTOF10: 7
PAINLEVEL_OUTOF10: 3
PAINLEVEL_OUTOF10: 6
PAINLEVEL_OUTOF10: 7
PAINLEVEL_OUTOF10: 2
PAINLEVEL_OUTOF10: 7
PAINLEVEL_OUTOF10: 2

## 2017-09-14 NOTE — PROGRESS NOTES
Pt received on her room awake, with surgical incision staples on her lower abdomen approximated and BEKAH. Fundus firm -1 below the umbilicus with light lochia discharge. Family @ bedside, FOB not involved. Pain well controlled with Motrin and Percocet given @ scheduled time per pt request. Needs attended. Call light within reach. Hourly rounding practiced.

## 2017-09-14 NOTE — CARE PLAN
Problem: Potential for postpartum infection related to surgical incision, compromised uterine condition, urinary tract or respiratory compromise  Goal: Patient will be afebrile and free from signs and symptoms of infection  Outcome: PROGRESSING AS EXPECTED  Patient has no signs/symptoms of infection.  Vital signs stable. Ambulating without difficulty    Problem: Alteration in comfort related to surgical incision and/or after birth pains  Goal: Patient verbalizes acceptable pain level  Outcome: PROGRESSING AS EXPECTED  Patient states pain control is adequate

## 2017-09-14 NOTE — PROGRESS NOTES
Name:   Joanne Claire   Date/Time:  2017 6:24 AM  Gestational Age:  40w0d  Admit Date:   2017  Admitting Dx:   Pregnancy  PREVIOUS  SECTION x2; 39+5 WEEKS GESTATION  Labor and delivery, indication for care    POD# 2 S/P rpt LTCS    S:  Abdominal pain no   Ambulating   yes  Tolerating PO  yes  Flatus    no  Bleeding   Yes, improving  Voiding   yes   Dizziness   no  Breast feeding  no  Breast tenderness  no    O:  Pulse: 78  Blood Pressure: 117/74     Temp  Av.6 °C (97.8 °F)  Min: 36.4 °C (97.5 °F)  Max: 36.7 °C (98.1 °F)  Heart: regular rate and rhythm without gallops or murmurs  Lungs: clear bases  Abdomen: flat and soft/nontender / bowelsounds present / incision clean and dry, staples in place  Extremities: non-tender  Catheter: DC'd  No intake or output data in the 24 hours ending 17 0624    A:  POD# 2 S/P rpt LTCS  Stable/progressing well    P:  Routine C/S Postpartum care, continue pain management, encourage ambulation, anticipate DC POD#3     Kenzie Suarez M.D.

## 2017-09-14 NOTE — CARE PLAN
Problem: Altered physiologic condition related to postoperative  delivery  Goal: Patient physiologically stable as evidenced by normal lochia, palpable uterine involution and vital signs within normal limits  Fundus firm -1 below the umbilicus with light lochia discharge.    Problem: Potential for postpartum infection related to surgical incision, compromised uterine condition, urinary tract or respiratory compromise  Goal: Patient will be afebrile and free from signs and symptoms of infection  Intervention: Get patient out of bed and ambulating as ordered by MD/DO/CNM  Pt ambulating well, no difficulty noted. Surgical incision approximated and BEKAH, no s/s of infection noted.    Problem: Alteration in comfort related to surgical incision and/or after birth pains  Goal: Patient verbalizes acceptable pain level  Pain well controlled with Motrin and Percocet 1 tab given @ scheduled time per pt request

## 2017-09-15 VITALS
TEMPERATURE: 98 F | HEART RATE: 62 BPM | RESPIRATION RATE: 18 BRPM | SYSTOLIC BLOOD PRESSURE: 110 MMHG | HEIGHT: 66 IN | OXYGEN SATURATION: 97 % | WEIGHT: 211 LBS | DIASTOLIC BLOOD PRESSURE: 72 MMHG | BODY MASS INDEX: 33.91 KG/M2

## 2017-09-15 LAB
APPEARANCE UR: ABNORMAL
BILIRUB UR QL STRIP.AUTO: NEGATIVE
COLOR UR: ABNORMAL
GLUCOSE UR STRIP.AUTO-MCNC: NEGATIVE MG/DL
KETONES UR STRIP.AUTO-MCNC: NEGATIVE MG/DL
LEUKOCYTE ESTERASE UR QL STRIP.AUTO: ABNORMAL
MICRO URNS: ABNORMAL
NITRITE UR QL STRIP.AUTO: NEGATIVE
PH UR STRIP.AUTO: 8 [PH]
PROT UR QL STRIP: 30 MG/DL
RBC # URNS HPF: >150 /HPF
RBC UR QL AUTO: ABNORMAL
RENAL EPI CELLS #/AREA URNS HPF: ABNORMAL /HPF
SP GR UR STRIP.AUTO: 1
UROBILINOGEN UR STRIP.AUTO-MCNC: 2 MG/DL
WBC #/AREA URNS HPF: ABNORMAL /HPF

## 2017-09-15 PROCEDURE — 700102 HCHG RX REV CODE 250 W/ 637 OVERRIDE(OP): Performed by: OBSTETRICS & GYNECOLOGY

## 2017-09-15 PROCEDURE — A9270 NON-COVERED ITEM OR SERVICE: HCPCS | Performed by: OBSTETRICS & GYNECOLOGY

## 2017-09-15 PROCEDURE — 700112 HCHG RX REV CODE 229: Performed by: STUDENT IN AN ORGANIZED HEALTH CARE EDUCATION/TRAINING PROGRAM

## 2017-09-15 PROCEDURE — 700111 HCHG RX REV CODE 636 W/ 250 OVERRIDE (IP): Performed by: OBSTETRICS & GYNECOLOGY

## 2017-09-15 PROCEDURE — 81001 URINALYSIS AUTO W/SCOPE: CPT

## 2017-09-15 PROCEDURE — A9270 NON-COVERED ITEM OR SERVICE: HCPCS | Performed by: STUDENT IN AN ORGANIZED HEALTH CARE EDUCATION/TRAINING PROGRAM

## 2017-09-15 RX ORDER — IBUPROFEN 800 MG/1
800 TABLET ORAL EVERY 8 HOURS PRN
Qty: 30 TAB | Refills: 0 | Status: SHIPPED | OUTPATIENT
Start: 2017-09-15

## 2017-09-15 RX ORDER — OXYCODONE HYDROCHLORIDE AND ACETAMINOPHEN 5; 325 MG/1; MG/1
1 TABLET ORAL EVERY 4 HOURS PRN
Qty: 15 TAB | Refills: 0 | Status: SHIPPED | OUTPATIENT
Start: 2017-09-15

## 2017-09-15 RX ORDER — PSEUDOEPHEDRINE HCL 30 MG
100 TABLET ORAL 2 TIMES DAILY PRN
Qty: 60 CAP | Refills: 0 | Status: SHIPPED | OUTPATIENT
Start: 2017-09-15

## 2017-09-15 RX ADMIN — OXYCODONE HYDROCHLORIDE AND ACETAMINOPHEN 1 TABLET: 10; 325 TABLET ORAL at 10:04

## 2017-09-15 RX ADMIN — OXYCODONE HYDROCHLORIDE AND ACETAMINOPHEN 1 TABLET: 5; 325 TABLET ORAL at 02:00

## 2017-09-15 RX ADMIN — DOCUSATE SODIUM 100 MG: 100 CAPSULE ORAL at 10:03

## 2017-09-15 RX ADMIN — Medication 1 TABLET: at 10:04

## 2017-09-15 RX ADMIN — ONDANSETRON 4 MG: 4 TABLET, ORALLY DISINTEGRATING ORAL at 09:01

## 2017-09-15 RX ADMIN — IBUPROFEN 800 MG: 800 TABLET, FILM COATED ORAL at 10:03

## 2017-09-15 ASSESSMENT — PAIN SCALES - GENERAL
PAINLEVEL_OUTOF10: 4
PAINLEVEL_OUTOF10: 1
PAINLEVEL_OUTOF10: 6
PAINLEVEL_OUTOF10: 7
PAINLEVEL_OUTOF10: 0
PAINLEVEL_OUTOF10: 3

## 2017-09-15 NOTE — DISCHARGE INSTRUCTIONS
POSTPARTUM DISCHARGE INSTRUCTIONS FOR MOM    YOB: 1995   Age: 22 y.o.               Admit Date: 2017     Discharge Date: 9/15/2017  Attending Doctor:  Luzma Ramirez M.D.                  Allergies:  Review of patient's allergies indicates no known allergies.    Discharged to home by car. Discharged via wheelchair, hospital escort: Yes.  Special equipment needed: Not Applicable  Belongings with: Personal  Be sure to schedule a follow-up appointment with your primary care doctor or any specialists as instructed.     Discharge Plan:   Diet Plan: Discussed  Activity Level: Discussed  Confirmed Follow up Appointment: Patient to Call and Schedule Appointment  Medication Reconciliation Updated: Yes  Influenza Vaccine Indication: Patient Refuses    REASONS TO CALL YOUR OBSTETRICIAN:  1.   Persistent fever or shaking chills (Temperature higher than 100.4)  2.   Heavy bleeding (soaking more than 1 pad per hour); Passing clots  3.   Foul odor from vagina  4.   Mastitis (Breast infection; breast pain, chills, fever, redness)  5.   Urinary pain, burning or frequency  6.   Episiotomy infection  7.   Abdominal incision infection  8.   Severe depression longer than 24 hours    HAND WASHING  · Prior to handling the baby.  · Before breastfeeding or bottle feeding baby.  · After using the bathroom or changing the baby's diaper.    WOUND CARE  Ask your physician for additional care instructions.  In general:    ·  Incision:      · Keep clean and dry.    · Do NOT lift anything heavier than your baby for up to 6 weeks.    · There should not be any opening or pus.      VAGINAL CARE  · Nothing inside vagina for 6 weeks: no sexual intercourse, tampons or douching.  · Bleeding may continue for 2-4 weeks.  Amount may vary.    · Call your physician for heavy bleeding which means soaking more than 1 pad per hour    BIRTH CONTROL  · It is possible to become pregnant at any time after delivery and while  "breastfeeding.  · Plan to discuss a method of birth control with your physician at your follow up visit. visit.    DIET AND ELIMINATION  · Eating more fiber (bran cereal, fruits, and vegetables) and drinking plenty of fluids will help to avoid constipation.  · Urinary frequency after childbirth is normal.    POSTPARTUM BLUES  During the first few days after birth, you may experience a sense of the \"blues\" which may include impatience, irritability or even crying.  These feeling come and go quickly.  However, as many as 1 in 10 women experience emotional symptoms known as postpartum depression.    Postpartum depression:  May start as early as the second or third day after delivery or take several weeks or months to develop.  Symptoms of \"blues\" are present, but are more intense:  Crying spells; loss of appetite; feelings of hopelessness or loss of control; fear of touching the baby; over concern or no concern at all about the baby; little or no concern about your own appearance/caring for yourself; and/or inability to sleep or excessive sleeping.  Contact your physician if you are experiencing any of these symptoms.    Crisis Hotline:  · [place] Crisis Hotline:  6-907-LPYYBDN  Or 1-571.409.3478  · [place] Crisis Hotline:  1-431.533.3664  Or 039-264-0396    PREVENTING SHAKEN BABY:  If you are angry or stressed, PUT THE BABY IN THE CRIB, step away, take some deep breaths, and wait until you are calm to care for the baby.  DO NOT SHAKE THE BABY.  You are not alone, call a supporter for help.    · Crisis [facility] Center 24/7 crisis line 604-186-6745 or 1-794.597.8939  · You can also text them, text \"ANSWER\" to 542609    QUIT SMOKING/TOBACCO USE:  I understand the use of any tobacco products increases my chance of suffering from future heart disease and could cause other illnesses which may shorten my life. Quitting the use of tobacco products is the single most important thing I can do to improve my health. For further " information on smoking / tobacco cessation call a Toll Free Quit Line at 1-493.645.2569 (*National Cancer [place]) or 1-192.682.1465 (American Lung Association) or you can access the web based program at www.lungusa.org.    · [place] Tobacco Users Help Line:  (795) 871-2652       Toll Free: 1-684.120.6128  · Quit Tobacco Program [facility] (961)639-3522    DEPRESSION / SUICIDE RISK:  As you are discharged from this [facility], it is important to learn how to keep safe from harming yourself.    Recognize the warning signs:  · Abrupt changes in personality, positive or negative- including increase in energy   · Giving away possessions  · Change in eating patterns- significant weight changes-  positive or negative  · Change in sleeping patterns- unable to sleep or sleeping all the time   · Unwillingness or inability to communicate  · Depression  · Unusual sadness, discouragement and loneliness  · Talk of wanting to die  · Neglect of personal appearance   · Rebelliousness- reckless behavior  · Withdrawal from people/activities they love  · Confusion- inability to concentrate     If you or a loved one observes any of these behaviors or has concerns about self-harm, here's what you can do:  · Talk about it- your feelings and reasons for harming yourself  · Remove any means that you might use to hurt yourself (examples: pills, rope, extension cords, firearm)  · Get professional help from the community (Mental Health, Substance Abuse, psychological counseling)  · Do not be alone:Call your Safe Contact- someone whom you trust who will be there for you.  · Call your local CRISIS HOTLINE 310-7569 or 764-365-7831  · Call your local Children's Mobile Crisis Response Team [place] (156) 728-7477 or www.Intradiem  · Call the toll free National Suicide Prevention Hotlines   · National Suicide Prevention Lifeline 539-352-GEHR (7552)  · National Hope Line Network 800-SUICIDE  (544-4711)    DISCHARGE SURVEY:  [facility] you for choosing [facility].  We hope we provided you with very good care.  You may be receiving a survey in the mail.  Please fill it out.  Your opinion is valuable to us.    ADDITIONAL EDUCATIONAL MATERIALS GIVEN TO PATIENT:        My signature on this form indicates that:  1.  I have reviewed and understand the above information  2.  My questions regarding this information have been answered to my satisfaction.  3.  I have formulated a plan with my discharge nurse to obtain my prescribed medication for home.

## 2017-09-15 NOTE — PROGRESS NOTES
Dr. Suarez came to evaluate patient. Dr. Suarez spoke with Dr. Ramirez who states patient may be discharged to home as previously ordered.

## 2017-09-15 NOTE — CARE PLAN
Problem: Potential for postpartum infection related to surgical incision, compromised uterine condition, urinary tract or respiratory compromise  Goal: Patient will be afebrile and free from signs and symptoms of infection  Outcome: PROGRESSING AS EXPECTED  Patient is afebrile. Staples removed from low transverse abdominal incision. Incision is approximated and is without erythema, swelling or drainage.    Problem: Alteration in comfort related to surgical incision and/or after birth pains  Goal: Patient verbalizes acceptable pain level  Outcome: PROGRESSING AS EXPECTED  Patient states adequate relief of uterine cramping and incisional pain from ibuprofen and Percocet.

## 2017-09-15 NOTE — DISCHARGE SUMMARY
Discharge Summary:      Joanne Claire      Admit Date:   2017  Discharge Date:  9/15/2017     Admitting diagnosis:  Pregnancy  PREVIOUS  SECTION x2, 39+5 WEEKS GESTATION  Labor and delivery, indication for care  Discharge Diagnosis: Status post rpt LTCS  Pregnancy Complications: group B strep (treated)  Tubal Ligation:  no        History:  Past Medical History:   Diagnosis Date   • ASTHMA Dx @ 5 yo    inhaler - does use now - last 7 or 8 yo     OB History    Para Term  AB Living   3 2 2     2   SAB TAB Ectopic Molar Multiple Live Births             2      # Outcome Date GA Lbr [place]/2nd Weight Sex Delivery Anes PTL Lv   3 Current            2 Term 01/18/15 40w2d  3.26 kg (7 lb 3 oz) F CS-Unspec EPI Y LARS   1 Term 13 41w0d  3.657 kg (8 lb 1 oz) F CS-LTranv Spinal N LARS      Birth Comments: Arrest of dilation.           Review of patient's allergies indicates no known allergies.  Patient Active Problem List    Diagnosis Date Noted   • History of  delivery 2017     Priority: High   • Supervision of other high risk pregnancy, antepartum 2017     Priority: High   • ASCUS with positive high risk HPV cervical 2017     Priority: Medium   • ASTHMA      Priority: Medium   • Group B Streptococcus carrier, +RV culture, currently pregnant 2017   • Anemia - 10.2/31.6 on 2017        Hospital Course:   22 y.o. , now para 3, was admitted with the above mentioned diagnosis, underwent Repeat  repeat. Patient postpartum course was unremarkable, with progressive advancement in diet , ambulation and toleration of oral analgesia. Patient without complaints today and desires discharge on POD#3.      Vitals:    17 0800 17 2032 17 0800 17   BP: (!) 99/52 117/74 (!) 94/61 124/72   Pulse: 66 78 99 87   Resp:    Temp: 36.4 °C (97.5 °F) 36.7 °C (98.1 °F) 36.1 °C (97 °F) 36.8 °C (98.2 °F)   SpO2: 95% 98% 95% 95%    Weight:       Height:           Current Facility-Administered Medications   Medication Dose   • lactated ringers infusion     • LR infusion     • PRN oxytocin (PITOCIN) (20 Units/1000 mL) PRN for excessive uterine bleeding - See Admin Instr  125-999 mL/hr   • misoprostol (CYTOTEC) tablet 600 mcg  600 mcg   • methylergonovine (METHERGINE) injection 0.2 mg  0.2 mg   • docusate sodium (COLACE) capsule 100 mg  100 mg   • prenatal plus vitamin (STUARTNATAL 1+1) 27-1 MG tablet 1 Tab  1 Tab   • acetaminophen (TYLENOL) tablet 325 mg  325 mg   • diphenhydrAMINE (BENADRYL) injection 25 mg  25 mg    Or   • diphenhydrAMINE (BENADRYL) tablet/capsule 25 mg  25 mg   • ibuprofen (MOTRIN) tablet 800 mg  800 mg   • morphine (pf) 4 mg/ml injection 4 mg  4 mg   • ondansetron (ZOFRAN) syringe/vial injection 4 mg  4 mg    Or   • ondansetron (ZOFRAN ODT) dispertab 4 mg  4 mg   • oxycodone-acetaminophen (PERCOCET) 5-325 MG per tablet 1 Tab  1 Tab   • oxycodone-acetaminophen (PERCOCET-10)  MG per tablet 1 Tab  1 Tab       Exam:  CVS: regular rate and rhythm, no murmurs  Lungs: clear at bases  Breast Exam: negative  Abdomen: Abdomen soft, non-tender. BS normal  Fundus Non Tender: yes  Incision: dry and intact, healing well with good reapproximation  Extremity: extremities and peripheral pulses normal, no edema, redness or tenderness in the calves     Labs:  Recent Labs      09/12/17   0725  09/12/17   1835   WBC  6.3  11.8*   RBC  4.25  3.81*   HEMOGLOBIN  9.6*  9.0*   HEMATOCRIT  30.3*  27.7*   MCV  71.3*  72.7*   MCH  22.6*  23.6*   MCHC  31.7*  32.5*   RDW  39.8  40.1   PLATELETCT  185  168   MPV  11.5  11.8        Activity:   Discharge to home  Pelvic Rest x 6 weeks    Assessment:  normal postpartum course  Discharge Assessment: No areas of skin breakdown/redness; surgical incision intact/healing, Taking adequate diet and fluids, Voiding without difficulty     Follow up:   TPC in 1 week for incision check.   To resume daily PNV  and iron supplement with hydration.   Patient to RT TPC or ER if any of the following occur:  Fever over 100.5  Severe abdominal pain  Red streaks or painful masses in the breasts  Foul smelling discharge or lochia  Heavy vaginal bleeding saturating a pad per hour  S/s of PP depression     Discharge Meds:   Current Outpatient Prescriptions   Medication Sig Dispense Refill   • oxycodone-acetaminophen (PERCOCET) 5-325 MG Tab Take 1 Tab by mouth every four hours as needed (for Moderate Pain (Pain Scale 4-6) after delivery). 15 Tab 0   • ibuprofen (MOTRIN) 800 MG Tab Take 1 Tab by mouth every 8 hours as needed (For cramping after delivery; do not give if patient is receiving ketorolac (Toradol)). 30 Tab 0   • docusate sodium 100 MG Cap Take 100 mg by mouth 2 times a day as needed for Constipation. 60 Cap 0       Kenzie Suarez M.D.      9/15/17 before discharge, called by RN to notify patient was feeling nauseated and had 1 episode of vomiting yesterday afternoon (9/14/17). She was also complaining of some flank pain that went to her back. UA done was negative for UTI and pain on exam appears related to chronic back pain. On exam patient is also soft, non-tender and she continues to pass gas, eat a full diet and has had no further episodes of emesis. Advised patient to avoid Percocet and take Motrin for post-op pain to avoid further nausea. Patient voiced understanding and is ready for discharge.

## 2017-09-15 NOTE — PROGRESS NOTES
0700-Bedside report received from Janny Prabhakar RN. Patient denies any needs at this time and states she will ask for pain medication as needed. Infant is rooming in.  0905-Assessment completed.Patient complained of nausea stating nausea began after breakfast. Patient states she was nauseated and vomited once after lunch yesterday. Patient medicated with Zofran PO. Will reassess.  Patient encouraged to call for any needs.  Discussed pain management.  Bonding with infant.  1010-Patient states nausea resolved after given Zofran. Patient complains of right flank pain radiating to the middle of her low back. Patient rates pain 7 out of 10. Patient states she is passing gas and states she is voiding without difficulty. Abdomen is soft and faint bowel sounds auscultated to all four quadrants of abdomen. Dr. Suarez called and notified. Orders given for clean catch urinalysis.

## 2017-09-19 ENCOUNTER — POST PARTUM (OUTPATIENT)
Dept: OBGYN | Facility: CLINIC | Age: 22
End: 2017-09-19
Payer: MEDICAID

## 2017-09-19 VITALS — WEIGHT: 196 LBS | SYSTOLIC BLOOD PRESSURE: 118 MMHG | BODY MASS INDEX: 31.64 KG/M2 | DIASTOLIC BLOOD PRESSURE: 74 MMHG

## 2017-09-19 DIAGNOSIS — Z48.89 ENCOUNTER FOR POSTOPERATIVE WOUND CHECK: ICD-10-CM

## 2017-09-19 PROCEDURE — 99024 POSTOP FOLLOW-UP VISIT: CPT | Performed by: OBSTETRICS & GYNECOLOGY

## 2021-10-01 ENCOUNTER — NON-PROVIDER VISIT (OUTPATIENT)
Dept: OCCUPATIONAL MEDICINE | Facility: CLINIC | Age: 26
End: 2021-10-01

## 2021-10-01 DIAGNOSIS — Z02.1 PRE-EMPLOYMENT DRUG SCREENING: ICD-10-CM

## 2021-10-01 LAB
AMP AMPHETAMINE: NORMAL
COC COCAINE: NORMAL
INT CON NEG: NORMAL
INT CON POS: NORMAL
MET METHAMPHETAMINES: NORMAL
OPI OPIATES: NORMAL
PCP PHENCYCLIDINE: NORMAL
POC DRUG COMMENT 753798-OCCUPATIONAL HEALTH: NORMAL
THC: NORMAL

## 2021-10-01 PROCEDURE — 80305 DRUG TEST PRSMV DIR OPT OBS: CPT | Performed by: NURSE PRACTITIONER

## (undated) DEVICE — STAPLER SKIN DISP - (6/BX 10BX/CA) VISISTAT

## (undated) DEVICE — PACK C-SECTION (2EA/CA)

## (undated) DEVICE — DETERGENT RENUZYME PLUS 10 OZ PACKET (50/BX)

## (undated) DEVICE — SLEEVE, SEQUENTIAL CALF REG

## (undated) DEVICE — PAD LAP STERILE 18 X 18 - (5/PK 40PK/CA)

## (undated) DEVICE — WATER IRRIG. STER. 1500 ML - (9/CA)

## (undated) DEVICE — SYRINGE SAFETY TB 1 CC 25 GA X 5/8 - NDL  (50/BX)

## (undated) DEVICE — CATHETER IV NON-SAFETY 18 GA X 1 1/4 (50/BX 4BX/CA)

## (undated) DEVICE — SUTURE 0 VICRYL PLUS CT 36 (36PK/BX)"

## (undated) DEVICE — SUTURE 0 VICRYL PLUS CT-1 - 36 INCH (36/BX)

## (undated) DEVICE — CLIPS FILSHIE FOR TUBAL - LIGATION SYSTEM (20PR/BX)

## (undated) DEVICE — ELECTRODE DUAL RETURN W/ CORD - (50/PK)

## (undated) DEVICE — SUTURE 3-0 VICRYL PLUS CT-1 - 36 INCH (36/BX)

## (undated) DEVICE — CANISTER SUCTION 3000ML MECHANICAL FILTER AUTO SHUTOFF MEDI-VAC NONSTERILE LF DISP  (40EA/CA)

## (undated) DEVICE — SHEATH RO 4F 10CM (10EA/BX)

## (undated) DEVICE — DRESSING INTERCEED ABSORBABLE ADHESION BARRIER TC7 (10EA/CA)

## (undated) DEVICE — SET EXTENSION WITH 2 PORTS (48EA/CA) ***PART #2C8610 IS A SUBSTITUTE*****

## (undated) DEVICE — TUBING CLEARLINK DUO-VENT - C-FLO (48EA/CA)

## (undated) DEVICE — KIT  I.V. START (100EA/CA)

## (undated) DEVICE — CHLORAPREP 26 ML APPLICATOR - ORANGE TINT(25/CA)

## (undated) DEVICE — TRAY BLADDER CARE W/ 16 FR FOLEY CATHETER STATLOCK  (10/CA)

## (undated) DEVICE — SUTURE 1 CHROMIC CTX ETHICON - (36PK/BX)

## (undated) DEVICE — BLANKET UNDERBODY FULL ACCES - (5/CA)

## (undated) DEVICE — SUTURE 0 GUT-PLAIN (36PK/BX)

## (undated) DEVICE — TRAY SPINAL ANESTHESIA NON-SAFETY (10/CA)

## (undated) DEVICE — SODIUM CHL IRRIGATION 0.9% 1000ML (12EA/CA)

## (undated) DEVICE — SUTURE 2-0 CHROMIC GUT CT-1 27 (36PK/BX)"

## (undated) DEVICE — GOWN SURGEONS LARGE - (32/CA)

## (undated) DEVICE — GLOVE BIOGEL SZ 8 SURGICAL PF LTX - (50PR/BX 4BX/CA)

## (undated) DEVICE — SUTURE 2-0 VICRYL PLUS CT-1 - 8 X 18 INCH(12/BX)

## (undated) DEVICE — TAPE CLOTH MEDIPORE 6 INCH - (12RL/CA)

## (undated) DEVICE — BOVIE  BLADE 6 EXTENDED - (50/PK)

## (undated) DEVICE — HEAD HOLDER JUNIOR/ADULT